# Patient Record
Sex: FEMALE | Race: WHITE | NOT HISPANIC OR LATINO | Employment: FULL TIME | URBAN - METROPOLITAN AREA
[De-identification: names, ages, dates, MRNs, and addresses within clinical notes are randomized per-mention and may not be internally consistent; named-entity substitution may affect disease eponyms.]

---

## 2017-01-23 RX ORDER — LISINOPRIL 20 MG/1
20 TABLET ORAL DAILY
Qty: 90 TABLET | Refills: 0 | Status: SHIPPED | OUTPATIENT
Start: 2017-01-23 | End: 2017-04-25 | Stop reason: SDUPTHER

## 2017-04-04 RX ORDER — ACYCLOVIR 400 MG/1
400 TABLET ORAL 2 TIMES DAILY
Qty: 60 TABLET | Refills: 11 | Status: SHIPPED | OUTPATIENT
Start: 2017-04-04 | End: 2018-05-14 | Stop reason: SDUPTHER

## 2017-04-25 RX ORDER — LISINOPRIL 20 MG/1
20 TABLET ORAL DAILY
Qty: 30 TABLET | Refills: 0 | Status: SHIPPED | OUTPATIENT
Start: 2017-04-25 | End: 2018-05-14

## 2017-06-20 ENCOUNTER — HOSPITAL ENCOUNTER (OUTPATIENT)
Dept: MAMMOGRAPHY | Facility: HOSPITAL | Age: 52
Discharge: HOME | End: 2017-06-20
Admitting: INTERNAL MEDICINE

## 2017-06-20 DIAGNOSIS — R92.8 ABNORMAL MAMMOGRAM: ICD-10-CM

## 2017-06-20 PROCEDURE — 77065 DX MAMMO INCL CAD UNI: CPT | Performed by: RADIOLOGY

## 2017-06-20 PROCEDURE — G0206 DX MAMMO INCL CAD UNI: HCPCS

## 2017-10-05 ENCOUNTER — TRANSCRIBE ORDERS (OUTPATIENT)
Dept: ADMINISTRATIVE | Facility: HOSPITAL | Age: 52
End: 2017-10-05

## 2017-10-05 DIAGNOSIS — R92.8 ABNORMAL MAMMOGRAM: Primary | ICD-10-CM

## 2017-10-19 ENCOUNTER — HOSPITAL ENCOUNTER (OUTPATIENT)
Dept: MAMMOGRAPHY | Facility: HOSPITAL | Age: 52
Discharge: HOME | End: 2017-10-19
Admitting: INTERNAL MEDICINE

## 2017-10-19 ENCOUNTER — TRANSCRIBE ORDERS (OUTPATIENT)
Dept: MAMMOGRAPHY | Facility: HOSPITAL | Age: 52
End: 2017-10-19

## 2017-10-19 DIAGNOSIS — R92.8 ABNORMAL MAMMOGRAM: ICD-10-CM

## 2017-10-19 DIAGNOSIS — R92.8 ABNORMAL MAMMOGRAM: Primary | ICD-10-CM

## 2017-10-19 PROCEDURE — G0279 TOMOSYNTHESIS, MAMMO: HCPCS

## 2017-10-19 PROCEDURE — 77062 BREAST TOMOSYNTHESIS BI: CPT | Performed by: RADIOLOGY

## 2017-10-19 PROCEDURE — G0204 DX MAMMO INCL CAD BI: HCPCS

## 2017-10-19 PROCEDURE — 77066 DX MAMMO INCL CAD BI: CPT | Performed by: RADIOLOGY

## 2017-11-06 ENCOUNTER — HOSPITAL ENCOUNTER (OUTPATIENT)
Dept: MAMMOGRAPHY | Facility: HOSPITAL | Age: 52
Discharge: HOME | End: 2017-11-06

## 2017-11-06 ENCOUNTER — HOSPITAL ENCOUNTER (OUTPATIENT)
Dept: MAMMOGRAPHY | Facility: HOSPITAL | Age: 52
Discharge: HOME | End: 2017-11-06
Attending: RADIOLOGY | Admitting: RADIOLOGY

## 2017-11-06 DIAGNOSIS — R92.8 ABNORMAL MAMMOGRAM: ICD-10-CM

## 2017-11-06 PROCEDURE — 88305 TISSUE EXAM BY PATHOLOGIST: CPT | Performed by: RADIOLOGY

## 2017-11-06 PROCEDURE — 76098 X-RAY EXAM SURGICAL SPECIMEN: CPT

## 2017-11-06 PROCEDURE — 19081 BX BREAST 1ST LESION STRTCTC: CPT | Performed by: RADIOLOGY

## 2017-11-06 PROCEDURE — A4648 IMPLANTABLE TISSUE MARKER: HCPCS

## 2017-11-06 RX ORDER — LIDOCAINE HYDROCHLORIDE AND EPINEPHRINE 10; 10 MG/ML; UG/ML
20 INJECTION, SOLUTION INFILTRATION; PERINEURAL ONCE
Status: COMPLETED | OUTPATIENT
Start: 2017-11-06 | End: 2017-11-06

## 2017-11-06 RX ORDER — LIDOCAINE HYDROCHLORIDE 10 MG/ML
5 INJECTION, SOLUTION INFILTRATION; PERINEURAL ONCE
Status: COMPLETED | OUTPATIENT
Start: 2017-11-06 | End: 2017-11-06

## 2017-11-06 RX ADMIN — LIDOCAINE HYDROCHLORIDE 4 ML: 10 INJECTION, SOLUTION INFILTRATION; PERINEURAL at 11:15

## 2017-11-06 RX ADMIN — LIDOCAINE HYDROCHLORIDE AND EPINEPHRINE 15 ML: 10; 10 INJECTION, SOLUTION INFILTRATION; PERINEURAL at 11:38

## 2017-11-07 LAB
CYTO UR: NORMAL
LAB AP CASE REPORT: NORMAL
LAB AP CLINICAL INFORMATION: NORMAL
LAB AP DIAGNOSIS COMMENT: NORMAL
Lab: NORMAL
PATH REPORT.FINAL DX SPEC: NORMAL
PATH REPORT.GROSS SPEC: NORMAL

## 2017-11-07 PROCEDURE — 77065 DX MAMMO INCL CAD UNI: CPT | Performed by: RADIOLOGY

## 2017-11-09 ENCOUNTER — TELEPHONE (OUTPATIENT)
Dept: MAMMOGRAPHY | Facility: HOSPITAL | Age: 52
End: 2017-11-09

## 2017-11-09 ENCOUNTER — TRANSCRIBE ORDERS (OUTPATIENT)
Dept: ADMINISTRATIVE | Facility: HOSPITAL | Age: 52
End: 2017-11-09

## 2017-11-09 DIAGNOSIS — R92.8 ABNORMAL MAMMOGRAM: Primary | ICD-10-CM

## 2017-11-09 NOTE — TELEPHONE ENCOUNTER
11.09.17 @ 1455: Pt notified of pathology results and recommendations. Verbalizes understanding. Denies discomfort. Denies any signs and symptoms of infection.

## 2018-05-10 ENCOUNTER — HOSPITAL ENCOUNTER (OUTPATIENT)
Dept: MAMMOGRAPHY | Facility: HOSPITAL | Age: 53
Discharge: HOME | End: 2018-05-10
Admitting: INTERNAL MEDICINE

## 2018-05-10 DIAGNOSIS — R92.8 ABNORMAL MAMMOGRAM: ICD-10-CM

## 2018-05-10 PROCEDURE — 77065 DX MAMMO INCL CAD UNI: CPT

## 2018-05-10 PROCEDURE — 77065 DX MAMMO INCL CAD UNI: CPT | Performed by: RADIOLOGY

## 2018-05-14 ENCOUNTER — OFFICE VISIT (OUTPATIENT)
Dept: INTERNAL MEDICINE | Facility: CLINIC | Age: 53
End: 2018-05-14

## 2018-05-14 VITALS
OXYGEN SATURATION: 97 % | TEMPERATURE: 98.8 F | HEIGHT: 60 IN | SYSTOLIC BLOOD PRESSURE: 108 MMHG | HEART RATE: 75 BPM | BODY MASS INDEX: 27.25 KG/M2 | WEIGHT: 138.8 LBS | DIASTOLIC BLOOD PRESSURE: 66 MMHG

## 2018-05-14 DIAGNOSIS — B00.9 HERPES SIMPLEX: Primary | ICD-10-CM

## 2018-05-14 PROCEDURE — 99213 OFFICE O/P EST LOW 20 MIN: CPT | Performed by: INTERNAL MEDICINE

## 2018-05-14 RX ORDER — ACYCLOVIR 400 MG/1
TABLET ORAL
Qty: 60 TABLET | Refills: 1 | OUTPATIENT
Start: 2018-05-14

## 2018-05-14 RX ORDER — ACYCLOVIR 400 MG/1
TABLET ORAL
Qty: 60 TABLET | Refills: 11 | Status: SHIPPED | OUTPATIENT
Start: 2018-05-14 | End: 2019-05-17 | Stop reason: SDUPTHER

## 2018-05-14 RX ORDER — TRAZODONE HYDROCHLORIDE 100 MG/1
100 TABLET ORAL NIGHTLY
COMMUNITY
End: 2020-10-12

## 2018-05-14 RX ORDER — ACYCLOVIR 400 MG/1
400 TABLET ORAL 3 TIMES DAILY
Qty: 15 TABLET | Refills: 5 | Status: SHIPPED | OUTPATIENT
Start: 2018-05-14 | End: 2018-05-19

## 2018-05-14 RX ORDER — HYDROXYZINE HYDROCHLORIDE 25 MG/1
25 TABLET, FILM COATED ORAL 3 TIMES DAILY PRN
COMMUNITY
End: 2020-10-12

## 2018-12-02 NOTE — PROGRESS NOTES
"Here for physical    Exercise: works part-time at [employer] and gets a lot of walking in then, but her main job is sedentary  Diet: has been eating more sweets recently; MVI daily    She had blood work done by Dr Ellsworth at [facility] on 10/19/18. CBC showed slightly elevated wbc at 10.5, Hct 36, MCV 75, CMP normal, D 44, TSH 5.25, normal FT4.  She has h/o alpha thalassemia trait and CBC is similar to what it always has been    In '13 we did thyroid ABs which were neg and a thyroid US which showed 2 small hypoechoic nodules felt to be colloid cysts on right. TSH had always been normal    Had L breast biopsy last year that showed fibrocystic changes    The following portions of the patient's history were reviewed and updated as appropriate: allergies, current medications, past family history, past medical history, past social history, past surgical history and problem list.    Review of Systems   Constitutional: + fatigue; neg fever. +wt gain  HENT: Negative for congestion and sore throat.    Eyes: Negative for visual disturbance.   Respiratory: Negative for cough and shortness of breath.    Cardiovascular: Negative for chest pain, palpitations and leg swelling.   Gastrointestinal: Negative for abdominal distention, abdominal pain, blood in stool, constipation, diarrhea and nausea.   Genitourinary: Negative for difficulty urinating, dysuria and frequency.   Musculoskeletal: Negative for arthralgias.   Skin: Negative for rash.   Allergic/Immunologic: Negative for immunocompromised state.   Neurological: Negative for dizziness and headaches.   Psychiatric/Behavioral: Negative for dysphoric mood and sleep disturbance.       Objective    Vitals:    12/03/18 1359   BP: 132/76   BP Location: Right arm   Pulse: 70   Temp: 98.7 °F (37.1 °C)   TempSrc: Temporal   SpO2: 99%   Weight: 68.2 kg (150 lb 6.4 oz)   Height: 149.9 cm (59\")     Physical Exam   Constitutional: She is oriented to person, place, and time. She appears " well-developed and well-nourished. No distress.   HENT:   Head: Normocephalic and atraumatic.   Right Ear: External ear normal.   Left Ear: External ear normal.   Mouth/Throat: Oropharynx is clear and moist. No oropharyngeal exudate.   Eyes: Conjunctivae and EOM are normal. Pupils are equal, round, and reactive to light.   Neck: Normal range of motion. Neck supple. No thyromegaly present.   Cardiovascular: Normal rate, regular rhythm, normal heart sounds and intact distal pulses.  Exam reveals no gallop and no friction rub.    No murmur heard.  Pulmonary/Chest: Effort normal and breath sounds normal. No respiratory distress. She has no wheezes. She has no rales.   Breast exam: no masses, no tenderness, no skin lesions B  Abdominal: Soft. Bowel sounds are normal. She exhibits no mass. There is no tenderness. There is no rebound and no guarding.   Musculoskeletal: Normal range of motion. She exhibits no edema or deformity.   Lymphadenopathy:     She has no cervical adenopathy.   Neurological: She is alert and oriented to person, place, and time. No cranial nerve deficit. She exhibits normal muscle tone. Coordination normal.   Skin: Skin is warm and dry. She is not diaphoretic.   Psychiatric: She has a normal mood and affect. Her behavior is normal. Judgment and thought content normal.       Assessment/Plan   Bhavani was seen today for annual exam.    Diagnoses and all orders for this visit:    Encounter for annual physical examRegular exercise/healthy diet. BSE q month. Sunscreen use encouraged. calcium intake reviewed. Check fasting labs  Mary Alice due now (6m diagnostic) - it is scheduled for 1/19  Colon due now  - we discussed risk of colon cancer but she still does not want to get. I did give her pamphlet on it  DT due 7/22  DEXA due now  Shingles - shingrix discussed -  can check at pharmacy since we do not have   Hep A - she had  Flu - she has had    -     POC Urinalysis Dipstick, Automated    Alpha thalassemia  (CMS/[facility])   -     CBC & Differential  -     CBC Auto Differential    Hypothyroidism (acquired)  Comments:  TSH slightly high in October. will rehcheck today  Orders:  -     TSH  -     T4, Free    Leukocytosis, unspecified type  -     CBC & Differential  -     CBC Auto Differential

## 2018-12-03 ENCOUNTER — OFFICE VISIT (OUTPATIENT)
Dept: INTERNAL MEDICINE | Facility: CLINIC | Age: 53
End: 2018-12-03

## 2018-12-03 VITALS
SYSTOLIC BLOOD PRESSURE: 132 MMHG | DIASTOLIC BLOOD PRESSURE: 76 MMHG | TEMPERATURE: 98.7 F | HEART RATE: 70 BPM | HEIGHT: 59 IN | BODY MASS INDEX: 30.32 KG/M2 | OXYGEN SATURATION: 99 % | WEIGHT: 150.4 LBS

## 2018-12-03 DIAGNOSIS — D72.829 LEUKOCYTOSIS, UNSPECIFIED TYPE: ICD-10-CM

## 2018-12-03 DIAGNOSIS — E03.9 HYPOTHYROIDISM (ACQUIRED): ICD-10-CM

## 2018-12-03 DIAGNOSIS — Z00.00 ENCOUNTER FOR ANNUAL PHYSICAL EXAM: Primary | ICD-10-CM

## 2018-12-03 DIAGNOSIS — D56.0 ALPHA THALASSEMIA (HCC): ICD-10-CM

## 2018-12-03 LAB
BASOPHILS # BLD AUTO: 0.03 10*3/MM3 (ref 0–0.2)
BASOPHILS NFR BLD AUTO: 0.3 % (ref 0–1)
BILIRUB BLD-MCNC: NEGATIVE MG/DL
CLARITY, POC: CLEAR
COLOR UR: YELLOW
DEPRECATED RDW RBC AUTO: 44.7 FL (ref 37–54)
EOSINOPHIL # BLD AUTO: 0.13 10*3/MM3 (ref 0–0.3)
EOSINOPHIL NFR BLD AUTO: 1.1 % (ref 0–3)
ERYTHROCYTE [DISTWIDTH] IN BLOOD BY AUTOMATED COUNT: 16.9 % (ref 11.3–14.5)
GLUCOSE UR STRIP-MCNC: NEGATIVE MG/DL
HCT VFR BLD AUTO: 34.7 % (ref 34.5–44)
HGB BLD-MCNC: 10.1 G/DL (ref 11.5–15.5)
IMM GRANULOCYTES # BLD: 0.1 10*3/MM3 (ref 0–0.03)
IMM GRANULOCYTES NFR BLD: 0.8 % (ref 0–0.6)
KETONES UR QL: NEGATIVE
LEUKOCYTE EST, POC: NEGATIVE
LYMPHOCYTES # BLD AUTO: 3.12 10*3/MM3 (ref 0.6–4.8)
LYMPHOCYTES NFR BLD AUTO: 26.2 % (ref 24–44)
MCH RBC QN AUTO: 20.9 PG (ref 27–31)
MCHC RBC AUTO-ENTMCNC: 29.1 G/DL (ref 32–36)
MCV RBC AUTO: 71.8 FL (ref 80–99)
MONOCYTES # BLD AUTO: 0.55 10*3/MM3 (ref 0–1)
MONOCYTES NFR BLD AUTO: 4.6 % (ref 0–12)
NEUTROPHILS # BLD AUTO: 7.99 10*3/MM3 (ref 1.5–8.3)
NEUTROPHILS NFR BLD AUTO: 67 % (ref 41–71)
NITRITE UR-MCNC: NEGATIVE MG/ML
PH UR: 6.5 [PH] (ref 5–8)
PLATELET # BLD AUTO: 364 10*3/MM3 (ref 150–450)
PMV BLD AUTO: 9 FL (ref 6–12)
PROT UR STRIP-MCNC: NEGATIVE MG/DL
RBC # BLD AUTO: 4.83 10*6/MM3 (ref 3.89–5.14)
RBC # UR STRIP: NEGATIVE /UL
SP GR UR: 1.01 (ref 1–1.03)
T4 FREE SERPL-MCNC: 0.87 NG/DL (ref 0.89–1.76)
TSH SERPL DL<=0.05 MIU/L-ACNC: 1.5 MIU/ML (ref 0.35–5.35)
UROBILINOGEN UR QL: NORMAL
WBC NRBC COR # BLD: 11.92 10*3/MM3 (ref 3.5–10.8)

## 2018-12-03 PROCEDURE — 81003 URINALYSIS AUTO W/O SCOPE: CPT | Performed by: INTERNAL MEDICINE

## 2018-12-03 PROCEDURE — 85025 COMPLETE CBC W/AUTO DIFF WBC: CPT | Performed by: INTERNAL MEDICINE

## 2018-12-03 PROCEDURE — 99396 PREV VISIT EST AGE 40-64: CPT | Performed by: INTERNAL MEDICINE

## 2018-12-03 PROCEDURE — 84443 ASSAY THYROID STIM HORMONE: CPT | Performed by: INTERNAL MEDICINE

## 2018-12-03 PROCEDURE — 84439 ASSAY OF FREE THYROXINE: CPT | Performed by: INTERNAL MEDICINE

## 2018-12-05 DIAGNOSIS — E03.9 HYPOTHYROIDISM (ACQUIRED): Primary | ICD-10-CM

## 2018-12-05 RX ORDER — LEVOTHYROXINE SODIUM 0.03 MG/1
25 TABLET ORAL DAILY
Qty: 30 TABLET | Refills: 2 | Status: SHIPPED | OUTPATIENT
Start: 2018-12-05 | End: 2019-02-28

## 2019-01-08 ENCOUNTER — TELEPHONE (OUTPATIENT)
Dept: INTERNAL MEDICINE | Facility: CLINIC | Age: 54
End: 2019-01-08

## 2019-01-08 NOTE — TELEPHONE ENCOUNTER
It looks like it has already been scheduled for 1/31. If she wants to change date she should be able to call breast center directly

## 2019-01-08 NOTE — TELEPHONE ENCOUNTER
MS. RAJPUT IS NEEDING A NEW MAMMO, ULTRASOUND OF HER LEFT BREAST, SHE IS HAVING PAIN WHERE HER CLIP IS IN HER LEFT BREAST. PLEASE CALL MS. RAJPUT WHEN THIS ORDER HAS BEEN ENTERED.

## 2019-01-10 NOTE — PROGRESS NOTES
"Subjective   Bhavani Coon is a 53 y.o. female.     History of Present Illness     L breast pain on the medial breast over the last 2 weeks, and some crust on the left nipple last few days, though no discharge seen.  Has not felt any definite nodules in the breast. She is status post benign stereotactic core biopsy of calcifications in the left breast done in 11/17. She is scheduled for a diagnostic hailee at the end of this month.  Caffeine intake - 2-3 diet cokes a day, 1 coffee    R Shoulder pain over the last 4-5 months - she had seen [facility](Dr Carrizales) in 8/16 for R shoulder pain and had an MRI then that did not show any rotator cuff tear, but did show some labral changes. She did PT at the time. She does not think she had an injection at the point. Pain did get better, but now has restarted. Does have to reach up a lot at work, so wondering if this is contributing. She did see [facility] again a few weeks ago and they  xrayed and wanted to get another MRI but her insurance won't cover so she would like to get a shot      The following portions of the patient's history were reviewed and updated as appropriate: allergies, current medications, past family history, past medical history, past social history, past surgical history and problem list.    Review of Systems   Constitutional: Negative for unexpected weight gain and unexpected weight loss.   Musculoskeletal: Positive for arthralgias.   Skin: Negative for color change and skin lesions.       Objective    Vitals:    01/11/19 1326   BP: 122/84   BP Location: Right arm   Temp: 98.8 °F (37.1 °C)   TempSrc: Temporal   Weight: 69 kg (152 lb 3.2 oz)   Height: 149.9 cm (59\")     Physical Exam   Constitutional: She is oriented to person, place, and time. She appears well-developed and well-nourished. No distress.   HENT:   Head: Normocephalic and atraumatic.   Nose: Nose normal.   Eyes: Conjunctivae and EOM are normal. Pupils are equal, round, and reactive to light. Right eye " exhibits no discharge. Left eye exhibits no discharge. No scleral icterus.   Pulmonary/Chest: Left breast exhibits tenderness. Left breast exhibits no inverted nipple, no mass, no nipple discharge and no skin change.       Neurological: She is alert and oriented to person, place, and time. No cranial nerve deficit.   Skin: Skin is warm and dry. No rash noted. She is not diaphoretic. No erythema.   Psychiatric: She has a normal mood and affect. Her behavior is normal. Judgment and thought content normal.   Nursing note and vitals reviewed.        Assessment/Plan   Bhavani was seen today for breast pain.    Diagnoses and all orders for this visit:    Breast pain, left  Comments:  reassurance - exam is normal today. she has diagnostic scheduled for the end of the month    Chronic right shoulder pain  Comments:  she will call [facility] to see if she can get injection

## 2019-01-11 ENCOUNTER — OFFICE VISIT (OUTPATIENT)
Dept: INTERNAL MEDICINE | Facility: CLINIC | Age: 54
End: 2019-01-11

## 2019-01-11 VITALS
WEIGHT: 152.2 LBS | DIASTOLIC BLOOD PRESSURE: 84 MMHG | TEMPERATURE: 98.8 F | HEIGHT: 59 IN | SYSTOLIC BLOOD PRESSURE: 122 MMHG | BODY MASS INDEX: 30.68 KG/M2

## 2019-01-11 DIAGNOSIS — M25.511 CHRONIC RIGHT SHOULDER PAIN: ICD-10-CM

## 2019-01-11 DIAGNOSIS — G89.29 CHRONIC RIGHT SHOULDER PAIN: ICD-10-CM

## 2019-01-11 DIAGNOSIS — N64.4 BREAST PAIN, LEFT: Primary | ICD-10-CM

## 2019-01-11 PROCEDURE — 99213 OFFICE O/P EST LOW 20 MIN: CPT | Performed by: INTERNAL MEDICINE

## 2019-01-31 ENCOUNTER — HOSPITAL ENCOUNTER (OUTPATIENT)
Dept: ULTRASOUND IMAGING | Facility: HOSPITAL | Age: 54
Discharge: HOME | End: 2019-01-31

## 2019-01-31 ENCOUNTER — HOSPITAL ENCOUNTER (OUTPATIENT)
Dept: MAMMOGRAPHY | Facility: HOSPITAL | Age: 54
Discharge: HOME | End: 2019-01-31
Admitting: RADIOLOGY

## 2019-01-31 ENCOUNTER — TRANSCRIBE ORDERS (OUTPATIENT)
Dept: MAMMOGRAPHY | Facility: HOSPITAL | Age: 54
End: 2019-01-31

## 2019-01-31 DIAGNOSIS — R92.8 ABNORMAL MAMMOGRAM: Primary | ICD-10-CM

## 2019-01-31 DIAGNOSIS — R92.8 ABNORMAL MAMMOGRAM: ICD-10-CM

## 2019-01-31 PROCEDURE — 77066 DX MAMMO INCL CAD BI: CPT

## 2019-01-31 PROCEDURE — 76642 ULTRASOUND BREAST LIMITED: CPT | Performed by: RADIOLOGY

## 2019-01-31 PROCEDURE — 77062 BREAST TOMOSYNTHESIS BI: CPT | Performed by: RADIOLOGY

## 2019-01-31 PROCEDURE — G0279 TOMOSYNTHESIS, MAMMO: HCPCS

## 2019-01-31 PROCEDURE — 77066 DX MAMMO INCL CAD BI: CPT | Performed by: RADIOLOGY

## 2019-01-31 PROCEDURE — 76642 ULTRASOUND BREAST LIMITED: CPT

## 2019-02-24 DIAGNOSIS — E03.9 HYPOTHYROIDISM (ACQUIRED): ICD-10-CM

## 2019-02-25 DIAGNOSIS — E03.9 HYPOTHYROIDISM (ACQUIRED): Primary | ICD-10-CM

## 2019-02-28 ENCOUNTER — LAB (OUTPATIENT)
Dept: INTERNAL MEDICINE | Facility: CLINIC | Age: 54
End: 2019-02-28

## 2019-02-28 DIAGNOSIS — E03.9 HYPOTHYROIDISM (ACQUIRED): ICD-10-CM

## 2019-02-28 LAB
T4 FREE SERPL-MCNC: 1.21 NG/DL (ref 0.89–1.76)
TSH SERPL DL<=0.05 MIU/L-ACNC: 3.42 MIU/ML (ref 0.35–5.35)

## 2019-02-28 PROCEDURE — 84439 ASSAY OF FREE THYROXINE: CPT | Performed by: INTERNAL MEDICINE

## 2019-02-28 PROCEDURE — 84443 ASSAY THYROID STIM HORMONE: CPT | Performed by: INTERNAL MEDICINE

## 2019-02-28 RX ORDER — LEVOTHYROXINE SODIUM 0.03 MG/1
TABLET ORAL
Qty: 30 TABLET | Refills: 2 | OUTPATIENT
Start: 2019-02-28

## 2019-02-28 RX ORDER — LEVOTHYROXINE SODIUM 0.05 MG/1
50 TABLET ORAL DAILY
Qty: 30 TABLET | Refills: 2 | Status: SHIPPED | OUTPATIENT
Start: 2019-02-28 | End: 2019-05-23 | Stop reason: SDUPTHER

## 2019-03-05 PROBLEM — E03.9 HYPOTHYROIDISM (ACQUIRED): Status: ACTIVE | Noted: 2019-03-05

## 2019-03-06 ENCOUNTER — OFFICE VISIT (OUTPATIENT)
Dept: INTERNAL MEDICINE | Facility: CLINIC | Age: 54
End: 2019-03-06

## 2019-03-06 ENCOUNTER — TELEPHONE (OUTPATIENT)
Dept: INTERNAL MEDICINE | Facility: CLINIC | Age: 54
End: 2019-03-06

## 2019-03-06 DIAGNOSIS — Z53.21 PATIENT LEFT WITHOUT BEING SEEN: Primary | ICD-10-CM

## 2019-03-06 NOTE — TELEPHONE ENCOUNTER
She has an appointment on March 12 with Dr. Barrientos for the hemorrhoids.  She wanted to let you know this.

## 2019-03-22 ENCOUNTER — OFFICE VISIT (OUTPATIENT)
Dept: INTERNAL MEDICINE | Facility: CLINIC | Age: 54
End: 2019-03-22

## 2019-03-22 VITALS
OXYGEN SATURATION: 98 % | SYSTOLIC BLOOD PRESSURE: 130 MMHG | TEMPERATURE: 99.4 F | BODY MASS INDEX: 31.45 KG/M2 | WEIGHT: 156 LBS | HEIGHT: 59 IN | DIASTOLIC BLOOD PRESSURE: 84 MMHG | HEART RATE: 75 BPM

## 2019-03-22 DIAGNOSIS — R63.1 POLYDIPSIA: ICD-10-CM

## 2019-03-22 DIAGNOSIS — R06.02 SOB (SHORTNESS OF BREATH): ICD-10-CM

## 2019-03-22 DIAGNOSIS — I10 BENIGN ESSENTIAL HYPERTENSION: ICD-10-CM

## 2019-03-22 DIAGNOSIS — E03.9 HYPOTHYROIDISM (ACQUIRED): ICD-10-CM

## 2019-03-22 DIAGNOSIS — R60.9 FLUID RETENTION: Primary | ICD-10-CM

## 2019-03-22 LAB
ALBUMIN SERPL-MCNC: 4.12 G/DL (ref 3.2–4.8)
ALBUMIN/GLOB SERPL: 2.1 G/DL (ref 1.5–2.5)
ALP SERPL-CCNC: 87 U/L (ref 25–100)
ALT SERPL W P-5'-P-CCNC: 36 U/L (ref 7–40)
ANION GAP SERPL CALCULATED.3IONS-SCNC: 8 MMOL/L (ref 3–11)
AST SERPL-CCNC: 26 U/L (ref 0–33)
BILIRUB BLD-MCNC: NEGATIVE MG/DL
BILIRUB SERPL-MCNC: 0.2 MG/DL (ref 0.3–1.2)
BUN BLD-MCNC: 5 MG/DL (ref 9–23)
BUN/CREAT SERPL: 7.8 (ref 7–25)
CALCIUM SPEC-SCNC: 9.1 MG/DL (ref 8.7–10.4)
CHLORIDE SERPL-SCNC: 105 MMOL/L (ref 99–109)
CLARITY, POC: CLEAR
CO2 SERPL-SCNC: 29 MMOL/L (ref 20–31)
COLOR UR: YELLOW
CREAT BLD-MCNC: 0.64 MG/DL (ref 0.6–1.3)
GFR SERPL CREATININE-BSD FRML MDRD: 97 ML/MIN/1.73
GLOBULIN UR ELPH-MCNC: 2 GM/DL
GLUCOSE BLD-MCNC: 89 MG/DL (ref 70–100)
GLUCOSE UR STRIP-MCNC: NEGATIVE MG/DL
KETONES UR QL: NEGATIVE
LEUKOCYTE EST, POC: NEGATIVE
NITRITE UR-MCNC: NEGATIVE MG/ML
PH UR: 7.5 [PH] (ref 5–8)
POTASSIUM BLD-SCNC: 3.9 MMOL/L (ref 3.5–5.5)
PROT SERPL-MCNC: 6.1 G/DL (ref 5.7–8.2)
PROT UR STRIP-MCNC: NEGATIVE MG/DL
RBC # UR STRIP: NEGATIVE /UL
SODIUM BLD-SCNC: 142 MMOL/L (ref 132–146)
SP GR UR: 1 (ref 1–1.03)
UROBILINOGEN UR QL: NORMAL

## 2019-03-22 PROCEDURE — 99214 OFFICE O/P EST MOD 30 MIN: CPT | Performed by: INTERNAL MEDICINE

## 2019-03-22 PROCEDURE — 81003 URINALYSIS AUTO W/O SCOPE: CPT | Performed by: INTERNAL MEDICINE

## 2019-03-22 PROCEDURE — 80053 COMPREHEN METABOLIC PANEL: CPT | Performed by: INTERNAL MEDICINE

## 2019-03-22 PROCEDURE — 83880 ASSAY OF NATRIURETIC PEPTIDE: CPT | Performed by: INTERNAL MEDICINE

## 2019-03-22 PROCEDURE — 36415 COLL VENOUS BLD VENIPUNCTURE: CPT | Performed by: INTERNAL MEDICINE

## 2019-03-22 NOTE — PROGRESS NOTES
"Subjective   Bhavani Coon is a 54 y.o. female.     History of Present Illness     Here for f/u on:  Fluid retention over the last week throughout body. Started in both ankles, then felt like it was throughout her body. She feels more winded. No orthopnea. No PND  She tries to watch her salt intake. Not a lot of fast food    She felt like she was getting a cold so started ibuprofen/sudafed [place] and is taking 1-2 daily, but no other OTCs.         The following portions of the patient's history were reviewed and updated as appropriate: allergies, current medications, past family history, past medical history, past social history, past surgical history and problem list.    Review of Systems   Constitutional: Positive for appetite change and unexpected weight gain. Negative for fever.   HENT: Positive for congestion.    Respiratory: Positive for shortness of breath.    Cardiovascular: Negative for chest pain.   Gastrointestinal: Negative for constipation and diarrhea.   Endocrine: Positive for polyuria.   Genitourinary: Positive for frequency.   Skin: Negative.    Allergic/Immunologic: Negative for immunocompromised state.   Neurological: Negative for seizures, memory problem and confusion.   Psychiatric/Behavioral: Negative for agitation.       Objective   /84 (BP Location: Right arm)   Pulse 75   Temp 99.4 °F (37.4 °C) (Temporal)   Ht 149.9 cm (59\")   Wt 70.8 kg (156 lb)   LMP  (LMP Unknown)   SpO2 98%   BMI 31.51 kg/m²   Physical Exam   Constitutional: She is oriented to person, place, and time. She appears well-developed and well-nourished. No distress.   HENT:   Head: Normocephalic and atraumatic.   Eyes: Conjunctivae and EOM are normal. Pupils are equal, round, and reactive to light. Right eye exhibits no discharge. Left eye exhibits no discharge.   Neck: Normal range of motion. Neck supple.   2cm posterior cervical lymph node   Cardiovascular: Normal rate and regular rhythm. "   Pulmonary/Chest: Effort normal and breath sounds normal. No stridor. No respiratory distress. She has no wheezes. She has no rales.   Abdominal: Soft. Bowel sounds are normal.   Musculoskeletal: She exhibits edema (1+ pittine edema B LEs).   Neurological: She is alert and oriented to person, place, and time. No cranial nerve deficit.   Skin: Skin is warm and dry. No rash noted. She is not diaphoretic.   Psychiatric: She has a normal mood and affect. Her behavior is normal. Judgment and thought content normal.   Nursing note and vitals reviewed.        Assessment/Plan   Bhavani was seen today for edema.    Diagnoses and all orders for this visit:    Fluid retention -we will have her DC the ibuprofen/Sudafed.  We will check UA and blood work.  Continue low-sodium diet and try to keep legs elevated.  We will consider further workup depending on lab results  -     Comprehensive Metabolic Panel  -     BNP  -     POC Urinalysis Dipstick, Automated    Hypothyroidism (acquired) -we raised dose last month and we will plan to recheck around May      SOB (shortness of breath) -pulse ox normal today.   -     Comprehensive Metabolic Panel  -     BNP    Polydipsia  -     POC Urinalysis Dipstick, Automated

## 2019-04-05 LAB — BNP SERPL-MCNC: 41 PG/ML (ref 0–100)

## 2019-05-17 RX ORDER — ACYCLOVIR 400 MG/1
TABLET ORAL
Qty: 60 TABLET | Refills: 11 | Status: SHIPPED | OUTPATIENT
Start: 2019-05-17 | End: 2019-11-11 | Stop reason: SDUPTHER

## 2019-05-23 DIAGNOSIS — E03.9 HYPOTHYROIDISM (ACQUIRED): Primary | ICD-10-CM

## 2019-05-23 NOTE — TELEPHONE ENCOUNTER
Patient will stop in tomorrow to have labs drawn. She is not out of medication yet. Can we make sure lab order is in . Thank you

## 2019-05-23 NOTE — TELEPHONE ENCOUNTER
She is due for recheck thyroid labs as we had raised her dose in Feb. I can put in order for labs, if she can stop by. Does she need refill or does she have enough until she can come in?

## 2019-05-24 ENCOUNTER — LAB (OUTPATIENT)
Dept: INTERNAL MEDICINE | Facility: CLINIC | Age: 54
End: 2019-05-24

## 2019-05-24 DIAGNOSIS — E03.9 HYPOTHYROIDISM (ACQUIRED): ICD-10-CM

## 2019-05-24 LAB
T4 FREE SERPL-MCNC: 1.38 NG/DL (ref 0.93–1.7)
TSH SERPL DL<=0.05 MIU/L-ACNC: 0.35 MIU/ML (ref 0.27–4.2)

## 2019-05-24 PROCEDURE — 84439 ASSAY OF FREE THYROXINE: CPT | Performed by: INTERNAL MEDICINE

## 2019-05-24 PROCEDURE — 84443 ASSAY THYROID STIM HORMONE: CPT | Performed by: INTERNAL MEDICINE

## 2019-05-25 RX ORDER — LEVOTHYROXINE SODIUM 0.05 MG/1
TABLET ORAL
Qty: 30 TABLET | Refills: 5 | Status: SHIPPED | OUTPATIENT
Start: 2019-05-25 | End: 2019-11-25 | Stop reason: SDUPTHER

## 2019-05-30 NOTE — PROGRESS NOTES
History of Present Illness   Here for f/u on:    Genital herpes - she had been on bid acyclovir as preventative,  but had been out for some time, and had a breakout of the genital herpes 3 days ago.  [facility] did give her a few for the weekend, but finished these and still w/ discomfort.  When she had been on the preventative dose, did not get outbreaks much at all    She is seeing [facility] and is on  hyrdoxyine and trazodone and doing ok on this combination. Dr Pugh, her GYN, had started lexapro as well for menopausal type symptoms    She had some labs done through [facility] recently through work, but she said there was some issue with the labs and maybe weren't actually done    H/o HTN - she had been on lisinopril but has been off for some time and BP has been OK    The following portions of the patient's history were reviewed and updated as appropriate: allergies, current medications, past family history, past medical history, past social history, past surgical history and problem list.    Review of Systems   Constitutional: Negative for fatigue and fever. +wt gain  Psych: anxiety - better w/ current med regimen   : Herpes outbreak. +hot flashes - on lexapro      Objective    Vitals:    05/14/18 1517   BP: 108/66   Pulse: 75   Temp: 98.8 °F (37.1 °C)   SpO2: 97%     Physical Exam   Constitutional: oriented to person, place, and time. well-developed and well-nourished. No distress.   HENT:   Head: Normocephalic and atraumatic.   Eyes: Conjunctivae and EOM are normal.   Cardiovascular: Normal rate, regular rhythm and normal heart sounds.  Exam reveals no gallop and no friction rub.    No murmur heard.  Pulmonary/Chest: Effort normal and breath sounds normal. No respiratory distress.  no wheezes.   Abd: soft, NTND  Neurological:  alert and oriented to person, place, and time.   Skin: Skin is warm and dry. not diaphoretic.   Psychiatric: normal mood and affect. Behavior and judgement normal    Assessment/Plan    Bhavani was seen today for med refill.    Diagnoses and all orders for this visit:    Herpes simplex - recent outbreak off the preventative. Will have her treat the outbreak w/ the tid dosing x 5 days, then restart the BID preventative dose  -     acyclovir (ZOVIRAX) 400 MG tablet; 1 bid. Start after finishing the 3x/day x 5 day dosing  -     acyclovir (ZOVIRAX) 400 MG tablet; Take 1 tablet by mouth 3 (Three) Times a Day for 5 days.        Preventative:  Will have her schedule a physical   [de-identified] : neck supple trach in place  [Normal] : mucosa is normal [Midline] : trachea located in midline position

## 2019-08-01 ENCOUNTER — HOSPITAL ENCOUNTER (OUTPATIENT)
Dept: MAMMOGRAPHY | Facility: HOSPITAL | Age: 54
Discharge: HOME | End: 2019-08-01
Attending: INTERNAL MEDICINE | Admitting: INTERNAL MEDICINE

## 2019-08-01 ENCOUNTER — TRANSCRIBE ORDERS (OUTPATIENT)
Dept: MAMMOGRAPHY | Facility: HOSPITAL | Age: 54
End: 2019-08-01

## 2019-08-01 DIAGNOSIS — R92.8 ABNORMAL MAMMOGRAM: ICD-10-CM

## 2019-08-01 DIAGNOSIS — R92.8 ABNORMAL MAMMOGRAM: Primary | ICD-10-CM

## 2019-08-01 PROCEDURE — 77065 DX MAMMO INCL CAD UNI: CPT

## 2019-08-01 PROCEDURE — 77065 DX MAMMO INCL CAD UNI: CPT | Performed by: RADIOLOGY

## 2019-08-01 PROCEDURE — G0279 TOMOSYNTHESIS, MAMMO: HCPCS

## 2019-09-23 ENCOUNTER — TELEPHONE (OUTPATIENT)
Dept: INTERNAL MEDICINE | Facility: CLINIC | Age: 54
End: 2019-09-23

## 2019-09-23 DIAGNOSIS — G89.29 CHRONIC RIGHT SHOULDER PAIN: Primary | ICD-10-CM

## 2019-09-23 DIAGNOSIS — M25.511 CHRONIC RIGHT SHOULDER PAIN: Primary | ICD-10-CM

## 2019-09-23 NOTE — TELEPHONE ENCOUNTER
Ok - I will put in referral. If she can get any imaging that [facility] did, and bring to appt w/ new ortho, that would be good

## 2019-09-23 NOTE — TELEPHONE ENCOUNTER
Patient states she would like to be referred to ortho.  She had discussed this at her physical appointment.

## 2019-09-23 NOTE — TELEPHONE ENCOUNTER
NIECY to  imaging at [facility] and take with her to her new appointment.

## 2019-09-23 NOTE — TELEPHONE ENCOUNTER
Pt called can barely move shoulder she states she talked in dr lawler and does not think she can do physical therapy at this time but really needs to get looked at

## 2019-10-10 ENCOUNTER — OFFICE VISIT (OUTPATIENT)
Dept: ORTHOPEDIC SURGERY | Facility: CLINIC | Age: 54
End: 2019-10-10

## 2019-10-10 VITALS — HEART RATE: 97 BPM | WEIGHT: 137 LBS | BODY MASS INDEX: 27.62 KG/M2 | OXYGEN SATURATION: 98 % | HEIGHT: 59 IN

## 2019-10-10 DIAGNOSIS — M25.512 ACUTE PAIN OF LEFT SHOULDER: Primary | ICD-10-CM

## 2019-10-10 DIAGNOSIS — S46.012A TRAUMATIC TEAR OF LEFT ROTATOR CUFF, UNSPECIFIED TEAR EXTENT, INITIAL ENCOUNTER: ICD-10-CM

## 2019-10-10 DIAGNOSIS — M75.02 ADHESIVE CAPSULITIS OF LEFT SHOULDER: ICD-10-CM

## 2019-10-10 PROCEDURE — 99203 OFFICE O/P NEW LOW 30 MIN: CPT | Performed by: PHYSICIAN ASSISTANT

## 2019-10-10 RX ORDER — BUSPIRONE HYDROCHLORIDE 10 MG/1
10 TABLET ORAL 3 TIMES DAILY
COMMUNITY
End: 2020-10-12

## 2019-10-10 NOTE — PROGRESS NOTES
[facility] Orthopaedic Surgery Clinic Note    Subjective     Chief Complaint   Patient presents with   • Left Shoulder - Pain        HPI  Bhavani Coon is a 54 y.o. female.  Left hand dominant.  Patient presents orthopedic clinic for left shoulder pain.  She states she has had intermittent pain to her left shoulder on and off for years however 2 months ago she got 2 new puppies and during a walk, they went one way and she the other.  Her arm was pulled/jerked.  She felt a pop in the shoulder area and had immediate pain.  Since then she has had continued increasing pain with inability to raise the arm.  Symptoms are worsening.  At this time she endorses a pain scale of 8/10.  Severity the pain moderate to severe.  Quality pain dull, aching, stabbing, shooting.  Associated symptoms grinding, stiffness and giving way.  The pain affects her sleep.  She has been taking Aleve without any benefit.  No reported numbness or tingling.  No prior history of injury or trauma to her shoulder.    No reported fever, chills, night sweats or other constitutional symptoms.    Past Medical History:   Diagnosis Date   • Abnormal EKG 2012    Dr Ceballos   • Alpha thalassemia (CMS/HCC)    • Anxiety    • Cyst of thyroid determined by ultrasound 10/2013    2 small colloid cysts, otherwise nml   • H/O mammogram 2017    [facility]   • Hemorrhoid    • Herpes simplex    • History of MRI of lumbar spine 01/2015    deg, post surgical changes   • Hx MRSA infection 06/2014    R leg   • Hx of mammogram 2014    dx'ic - probable benign calcifications, repeat in 6m   • Hypertension    • LBBB (left bundle branch block) 2014   • Microcytic anemia    • Pap smear for cervical cancer screening 2017    Dr. Pugh   • Plantar fasciitis of left foot    • Restless legs syndrome (RLS)    • Routine general medical examination at a health care facility    • Uncomplicated opioid dependence (CMS/HCC)       Past Surgical History:   Procedure Laterality Date   •  BREAST BIOPSY Left 2017    stereo bx left breast.    • CARDIAC CATHETERIZATION  2012    negative. Dr. Ceballos   • EYE MUSCLE SURGERY Left 01/2018   • KNEE SURGERY Right 2003    torn cartilage   • LAMINECTOMY  07/2009    L3 (Dr. Negro)   • LEEP  1995    cervical dysplasia   • LUMBAR DISCECTOMY  07/2009    Dr. Negro. L3-L4   • OOPHORECTOMY     • SUPRACERVICAL HYSTERECTOMY SALPINGO OOPHORECTOMY  2009    L SO      Family History   Problem Relation Age of Onset   • Hypertension Mother    • Anemia Mother    • Lymphoma Mother    • Hyperlipidemia Mother    • Liver cancer Father    • Diabetes Father    • Breast cancer Maternal Grandmother 57   • Stroke Maternal Grandmother    • Ovarian cancer Paternal Aunt 47     Social History     Socioeconomic History   • Marital status:      Spouse name: Not on file   • Number of children: Not on file   • Years of education: Not on file   • Highest education level: Not on file   Tobacco Use   • Smoking status: Never Smoker   Substance and Sexual Activity   • Alcohol use: Yes     Comment: 3-4/week   • Drug use: No   • Sexual activity: Not Currently      Current Outpatient Medications on File Prior to Visit   Medication Sig Dispense Refill   • acyclovir (ZOVIRAX) 400 MG tablet take 1 tablet by mouth twice a day AFTER FINISHING THE THREE TIMES A DAY FOR 5 DAYS DOSING 60 tablet 11   • busPIRone (BUSPAR) 10 MG tablet Take 10 mg by mouth 3 (Three) Times a Day.     • escitalopram (LEXAPRO) 10 MG tablet Take 1 tablet by mouth Daily. (Patient taking differently: Take 10 mg by mouth 3 (Three) Times a Day.) 30 tablet 5   • levothyroxine (SYNTHROID, LEVOTHROID) 50 MCG tablet take 1 tablet by mouth once daily 30 tablet 5   • hydrOXYzine (ATARAX) 25 MG tablet Take 25 mg by mouth 3 (Three) Times a Day As Needed for Itching.     • traZODone (DESYREL) 100 MG tablet Take 100 mg by mouth Every Night.       No current facility-administered medications on file prior to visit.       Allergies  "  Allergen Reactions   • Vitamin B12 Rash     Rash and nausea and vomiting        The following portions of the patient's history were reviewed and updated as appropriate: allergies, current medications, past family history, past medical history, past social history, past surgical history and problem list.    Review of Systems   Constitutional: Negative.    HENT: Negative.    Eyes: Negative.    Respiratory: Negative.    Cardiovascular: Negative.    Gastrointestinal: Negative.    Endocrine: Negative.    Genitourinary: Negative.    Musculoskeletal: Positive for arthralgias.   Skin: Negative.    Allergic/Immunologic: Negative.    Neurological: Negative.    Hematological: Negative.    Psychiatric/Behavioral: Negative.         Objective      Physical Exam  Pulse 97   Ht 149.9 cm (59.02\")   Wt 62.1 kg (137 lb)   LMP  (LMP Unknown)   SpO2 98%   BMI 27.66 kg/m²     Body mass index is 27.66 kg/m².    GENERAL APPEARANCE: awake, alert & oriented x 3, in no acute distress and well developed, well nourished  PSYCH: normal mood and affect  LUNGS:  breathing nonlabored, no wheezing  EYES: sclera anicteric, pupils equal  CARDIOVASCULAR: palpable pulses dorsalis pedis, palpable posterior tibial bilaterally. Capillary refill less than 2 seconds  INTEGUMENTARY: skin intact, no clubbing, cyanosis  NEUROLOGIC:  Normal Sensation         Ortho Exam  Musculoskeletal   Upper Extremity   Left Shoulder     Inspection and Palpation:     Tenderness -global tenderness throughout shoulder with increasing pain to the anterior and lateral aspect.    Crepitus - none    Sensation is normal    Examination reveals no ecchymosis.      Strength and Tone:    Supraspinatus - 3/5    External Rotators-3/5    Infraspinatus - 3/5    Subscapularis - 3/5    Deltoid  - fires     Range of Motion   Right shoulder:    Internal Rotation: ROM - T12    External Rotation: AROM - 80 degrees    Elevation through flexion: AROM - 180 degrees    Left " Shoulder:    Internal Rotation: ROM -side of her body    External Rotation: AROM - less than 25 degrees    Elevation through flexion: AROM - 80 degrees     Abduction: AROM - less than 45 degrees     Instability   Left shoulder    Sulcus sign negative    Apprehension test negative    Chan relocation test negative    Jerk test negative     Impingement   Left shoulder    Dudley-Karthikeyan impingement test unable to test because unable to get the arm into proper position but with any type of movement exacerbation of pain    Neer impingement test positive     Functional Testing   Left shoulder    AC crossover adduction test positive    Abdominal compression test positive    Lift-off sign unable to test because unable to get the arm into proper position but with any type of movement exacerbation of pain    Speed's test positive    Wood's test negative    Hornblower's sign negative       Imaging/Studies  Ordered left shoulder plain films.  Imaging reviewed by Dr. Drake.    Imaging Results (last 7 days)     Procedure Component Value Units Date/Time    XR Shoulder 2+ View Left [[id number]] Resulted:  10/10/19 1049     Updated:  10/10/19 1050    Narrative:       Indication: Left shoulder pain    Comparison: No prior xrays are available for comparison      Impression:            Left shoulder 3 views: Radiographs demonstrate no acute bony injury or   fracture.  Mild arthritic changes are seen about the glenohumeral joint   primarily inferiorly.  To moderate AC joint arthritis.  Glenohumeral joint   is concentrically reduced.          Assessment/Plan        ICD-10-CM ICD-9-CM   1. Acute pain of left shoulder M25.512 719.41   2. Traumatic tear of left rotator cuff, unspecified tear extent, initial encounter S46.012A 840.4   3. Adhesive capsulitis of left shoulder M75.02 726.0       Orders Placed This Encounter   Procedures   • XR Shoulder 2+ View Left   • MRI Shoulder Left Without Contrast        Acute left shoulder pain  secondary to rotator cuff tear and adhesive capsulitis.  Proceed with MRI for further evaluation as symptoms are worsening.  Recommend she continue with Aleve.  Gentle range of motion and stretching as much as possible to avoid further stiffness to the shoulder.  Follow-up after MRI completed to discuss results and further treatment options.  Questions and concerns answered.      Medical Decision Making  Management Options : over-the-counter medicine  Data/Risk: radiology tests       Jennifer Nieves PA-C  10/11/19  1:18 PM         EMR Dragon/Transcription disclaimer:  Much of this encounter note is an electronic transcription of spoken language to printed text. Electronic transcription of spoken language may permit erroneous, or at times, nonsensical words or phrases to be inadvertently transcribed. Although I have reviewed the note for such errors, some may still exist.

## 2019-10-22 ENCOUNTER — TELEPHONE (OUTPATIENT)
Dept: ORTHOPEDIC SURGERY | Facility: CLINIC | Age: 54
End: 2019-10-22

## 2019-10-22 RX ORDER — DIAZEPAM 5 MG/1
5 TABLET ORAL ONCE
Qty: 1 TABLET | Refills: 0 | Status: SHIPPED | OUTPATIENT
Start: 2019-10-22 | End: 2019-10-22

## 2019-10-22 NOTE — TELEPHONE ENCOUNTER
I called patient to let her know this and she will call with any further questions she may have.  Talisha

## 2019-10-22 NOTE — TELEPHONE ENCOUNTER
PATIENT WAS UNABLE TO TO DO HER MRI YESTERDAY DUE TO SEVERE ANXIETY.  PATIENT IS REQUESTING SOME SORT OF ANXIETY MEDICATION SO THAT SHE CAN DO THE MRI FOR HER SHOULDER.

## 2019-10-29 DIAGNOSIS — M25.512 ACUTE PAIN OF LEFT SHOULDER: ICD-10-CM

## 2019-10-29 DIAGNOSIS — S46.012A TRAUMATIC TEAR OF LEFT ROTATOR CUFF, UNSPECIFIED TEAR EXTENT, INITIAL ENCOUNTER: ICD-10-CM

## 2019-11-01 ENCOUNTER — OFFICE VISIT (OUTPATIENT)
Dept: ORTHOPEDIC SURGERY | Facility: CLINIC | Age: 54
End: 2019-11-01

## 2019-11-01 VITALS — BODY MASS INDEX: 27.6 KG/M2 | OXYGEN SATURATION: 99 % | HEART RATE: 93 BPM | WEIGHT: 136.91 LBS | HEIGHT: 59 IN

## 2019-11-01 DIAGNOSIS — M25.512 CHRONIC LEFT SHOULDER PAIN: Primary | ICD-10-CM

## 2019-11-01 DIAGNOSIS — M75.02 ADHESIVE CAPSULITIS OF LEFT SHOULDER: ICD-10-CM

## 2019-11-01 DIAGNOSIS — G89.29 CHRONIC LEFT SHOULDER PAIN: Primary | ICD-10-CM

## 2019-11-01 PROCEDURE — 99213 OFFICE O/P EST LOW 20 MIN: CPT | Performed by: PHYSICIAN ASSISTANT

## 2019-11-01 PROCEDURE — 20610 DRAIN/INJ JOINT/BURSA W/O US: CPT | Performed by: PHYSICIAN ASSISTANT

## 2019-11-01 RX ADMIN — LIDOCAINE HYDROCHLORIDE 4 ML: 10 INJECTION, SOLUTION EPIDURAL; INFILTRATION; INTRACAUDAL; PERINEURAL at 10:31

## 2019-11-01 RX ADMIN — TRIAMCINOLONE ACETONIDE 40 MG: 40 INJECTION, SUSPENSION INTRA-ARTICULAR; INTRAMUSCULAR at 10:31

## 2019-11-01 NOTE — PROGRESS NOTES
Procedure   Large Joint Arthrocentesis: L glenohumeral  Date/Time: 11/1/2019 10:31 AM  Consent given by: patient  Site marked: site marked  Timeout: Immediately prior to procedure a time out was called to verify the correct patient, procedure, equipment, support staff and site/side marked as required   Supporting Documentation  Indications: pain   Procedure Details  Location: shoulder - L glenohumeral  Preparation: Patient was prepped and draped in the usual sterile fashion  Needle size: 22 G  Approach: anterolateral  Medications administered: 40 mg triamcinolone acetonide 40 MG/ML; 4 mL lidocaine PF 1% 1 %  Patient tolerance: patient tolerated the procedure well with no immediate complications

## 2019-11-01 NOTE — PROGRESS NOTES
"    [facility] [place] [facility] Note    Subjective     CC: Follow-up (MRI Left shoulder)      HPI    Bhavani Coon is a 54 y.o. female.  Patient returns today for MRI follow-up.  She continues to have pain to the left shoulder with decreased range of motion.  She still endorsing a pain scale of 7/10.  Severity the pain moderate to severe.  Quality pain dull, aching, burning, throbbing, stabbing, shooting.  Associated symptoms popping, grinding, stiffness and sensation and giving away.  She is taking Aleve as needed.  Once again no numbness or tingling into the extremity.      ROS:    Constiutional:Pt denies fever, chills, nausea, or vomiting.  MSK:as above    Objective      Past Medical History  Past Medical History:   Diagnosis Date   • Abnormal EKG 2012    Dr Ceballos   • Alpha thalassemia (CMS/HCC)    • Anxiety    • Cyst of thyroid determined by ultrasound 10/2013    2 small colloid cysts, otherwise nml   • H/O mammogram 2017    Yazidism   • Hemorrhoid    • Herpes simplex    • History of MRI of lumbar spine 01/2015    deg, post surgical changes   • Hx MRSA infection 06/2014    R leg   • Hx of mammogram 2014    dx'ic - probable benign calcifications, repeat in 6m   • Hypertension    • LBBB (left bundle branch block) 2014   • Microcytic anemia    • Pap smear for cervical cancer screening 2017    Dr. Pugh   • Plantar fasciitis of left foot    • Restless legs syndrome (RLS)    • Routine general medical examination at a health care facility    • Uncomplicated opioid dependence (CMS/HCC)          Physical Exam  Pulse 93   Ht 149.9 cm (59.02\")   Wt 62.1 kg (136 lb 14.5 oz)   LMP  (LMP Unknown)   SpO2 99%   BMI 27.64 kg/m²     Body mass index is 27.64 kg/m².    Patient is well nourished and well developed.        Ortho Exam  Peripheral Vascular   Left Upper Extremity    No cyanotic nail beds    Pink nail beds and rapid capillary refill   Palpation    Radial Pulse - Bilaterally " normal    Musculoskeletal   Upper Extremity   Left Shoulder     Inspection and Palpation:    Sensation is normal     Strength and Tone:    Supraspinatus - 3/5    Infraspinatus - 3/5    Biceps - 5/5    Subscapularis-3/5      Tenderness: 10 use with global tenderness throughout shoulder especially to the anterior and lateral aspect.      Range of Motion   Left Shoulder:    Internal Rotation: ROM -side of her body.    External Rotation: AROM -15 degrees    Elevation through flexion: AROM - 80 degrees                           Abduction: AROM - less than 45 degrees                Impingement              Left shoulder                          Dudley-Karthikeyan impingement test unable to test because unable to get the arm into proper position but with any type of movement exacerbation of pain                          Neer impingement test positive                 Functional Testing              Left shoulder                          AC crossover adduction test positive                          Abdominal compression test positive                          Lift-off sign unable to test because unable to get the arm into proper position but with any type of movement exacerbation of pain                          Speed's test positive                          Wood's test negative                          Hornblower's sign negative      Imaging/Labs/EMG Reviewed:  Dr. Hudson and I reviewed noncontrast MRI of her left shoulder performed on 10/28/2019 at [facility].    Interpretation per Dr. Donell Rodarte: Capsulitis present with significant glenohumeral joint effusion.  Posterior inferior labrum appears small.  Degenerative spurring noted medial humeral head and neck.  Biceps tendon small without evidence of tear.    Images will be downloaded into our system.    Assessment:  1. Chronic left shoulder pain    2. Adhesive capsulitis of left shoulder        Plan:  1. Ongoing left shoulder pain due to adhesive capsulitis.     2. Patient offered and accepted a glenohumeral corticosteroid injection.  Injection was given today.  3. Patient was referred to formal physical therapy working on range of motion and stretching.  4. Recommend continue with over-the-counter pain medications as needed.  Patient is currently taking Aleve which I recommend she continue taking.  5. Follow-up in 6 weeks for repeat evaluation, sooner if issues arise or symptoms worsen/change.    After discussing the risks, benefits, indications of injection, the patient gave consent to proceed.  Left shoulder was confirmed as the correct joint to be injected with a timeout.  It was then prepped using Hibiclens and injected with a mixture of 4 cc of 1% plain lidocaine and 1 cc of Kenalog (40 mg per mL), without any resistance through the posterior approach, patient in seated position.  Area was cleaned, hemostasis was achieved and a Band-Aid was applied over the injection site.  The patient tolerated procedure well.  I instructed the patient on signs and symptoms of infection.  They should report to the emergency department or return to clinic if any of these develop, for further evaluation and treatment.  Recommended modifying activity for the next 48 hours to include rest, ice, elevation and oral pain medication as needed.        Jennifer Nieves PA-C  11/04/19  10:15 AM

## 2019-11-04 RX ORDER — TRIAMCINOLONE ACETONIDE 40 MG/ML
40 INJECTION, SUSPENSION INTRA-ARTICULAR; INTRAMUSCULAR
Status: COMPLETED | OUTPATIENT
Start: 2019-11-01 | End: 2019-11-01

## 2019-11-04 RX ORDER — LIDOCAINE HYDROCHLORIDE 10 MG/ML
4 INJECTION, SOLUTION EPIDURAL; INFILTRATION; INTRACAUDAL; PERINEURAL
Status: COMPLETED | OUTPATIENT
Start: 2019-11-01 | End: 2019-11-01

## 2019-11-05 ENCOUNTER — TELEPHONE (OUTPATIENT)
Dept: INTERNAL MEDICINE | Facility: CLINIC | Age: 54
End: 2019-11-05

## 2019-11-05 NOTE — TELEPHONE ENCOUNTER
Patient is taking the acyclovir twice a day on a regular basis.  Has a break out and has increase a couple of days to three a day and has not helped.

## 2019-11-05 NOTE — TELEPHONE ENCOUNTER
If it is not responding to the 3x/day dosing of acylovir it may be something else - if she can come in for appt so we can look at it

## 2019-11-06 ENCOUNTER — TREATMENT (OUTPATIENT)
Dept: PHYSICAL THERAPY | Facility: CLINIC | Age: 54
End: 2019-11-06

## 2019-11-06 DIAGNOSIS — G89.29 CHRONIC LEFT SHOULDER PAIN: Primary | ICD-10-CM

## 2019-11-06 DIAGNOSIS — M25.512 CHRONIC LEFT SHOULDER PAIN: Primary | ICD-10-CM

## 2019-11-06 PROCEDURE — 97140 MANUAL THERAPY 1/> REGIONS: CPT | Performed by: PHYSICAL THERAPIST

## 2019-11-06 PROCEDURE — 97110 THERAPEUTIC EXERCISES: CPT | Performed by: PHYSICAL THERAPIST

## 2019-11-06 PROCEDURE — 97161 PT EVAL LOW COMPLEX 20 MIN: CPT | Performed by: PHYSICAL THERAPIST

## 2019-11-06 NOTE — TELEPHONE ENCOUNTER
PN and she will try the three times a day dosing.  If it does not get any better she will make an appointment.

## 2019-11-06 NOTE — PROGRESS NOTES
Physical Therapy Initial Evaluation and Plan of Care    Patient: Bhavani Coon   : 1965  Diagnosis/ICD-10 Code:  Chronic left shoulder pain [M25.512, G89.29]  Referring practitioner: Jennifer Nieves, *  Date of Initial Visit: 2019  Today's Date: 2019  Patient seen for 1 sessions             Subjective Evaluation    History of Present Illness  Mechanism of injury: Pt reports that her shoulder has been hurting her and she has had some increased pain with trying to hold her two puppies. She started to have very bad pain in the shoulder and eventually went to the MD and had an injection in the left shoulder, and it seems to have decreased her pain slightly. The shoulder has been hurting for a couple of months now.       Patient Occupation: Pt does mostly desk work.  Quality of life: good    Pain  Current pain ratin  At best pain ratin  At worst pain ratin  Location: Left shoulder - specific area depends on position and movement  Quality: dull ache and sharp  Relieving factors: rest  Aggravating factors: lifting, movement, outstretched reach and overhead activity  Progression: no change    Hand dominance: left    Diagnostic Tests  MRI studies: abnormal (see imaging section in epic for detailed MRI report)    Treatments  Previous treatment: injection treatment  Patient Goals  Patient goals for therapy: decreased pain, increased motion, increased strength and independence with ADLs/IADLs             Objective       Palpation     Additional Palpation Details  TTP noted at the left anteroor GH joint line and left deltoid.     Active Range of Motion   Left Shoulder   Flexion: 121 degrees   Extension: 60 degrees   Abduction: 75 degrees   External rotation 0°: 31 degrees   Internal rotation BTB: T10     Right Shoulder   Flexion: 142 degrees   Extension: 70 degrees   Abduction: 125 degrees   External rotation 0°: 40 degrees   Internal rotation BTB: T8     Strength/Myotome Testing      Left Shoulder     Planes of Motion   Flexion: 4+   Abduction: 4   External rotation at 0°: 5   Internal rotation at 0°: 5     Tests     Left Shoulder   Positive Hawkin's.   Negative active compression (Wells), drop arm, empty can and full can.          Assessment & Plan     Assessment  Impairments: abnormal muscle tone, abnormal or restricted ROM, activity intolerance, impaired physical strength, lacks appropriate home exercise program and pain with function  Assessment details: Patient is a 54 year old female who comes to physical therapy with c.o pain in the left shoulder. Signs and symptoms are consistent with left shoulder impingement with possible RTC involvement resulting in pain, decreased ROM, decreased strength, and inability to perform all essential functional activities. Pt will benefit from skilled PT services to address the above issues.     Prognosis details: SHORT TERM GOALS:     2 weeks  1. Pt I w/ HEP  2. Pt to demonstrate PROM of the left shoulder to WFL to improve ability to perform ADL's  3. Pt to demonstrate ability to perform 30 minutes continuous activity in the clinic without increase in pain     LONG TERM GOALS:   6 weeks  1. Pt to demonstrate AROM of the left shoulder to WNL to allow ability to perform all necessary functional activities  2. Pt to demonstrate ability to lift 5# OH with the left arm without increase in pain  3. Pt to report being able to work full duty without increase in pain in the shoulder  4. Pt to tolerate 60 minutes continuous activity in the clinic without increase in pain     Functional Limitations: carrying objects, lifting, pulling, pushing, uncomfortable because of pain, reaching behind back, reaching overhead and unable to perform repetitive tasks  Plan  Therapy options: will be seen for skilled physical therapy services  Planned modality interventions: cryotherapy, electrical stimulation/Russian stimulation, high voltage pulsed current (pain management),  iontophoresis, microcurrent electrical stimulation, TENS, thermotherapy (hydrocollator packs) and ultrasound  Planned therapy interventions: abdominal trunk stabilization, ADL retraining, body mechanics training, flexibility, functional ROM exercises, home exercise program, IADL retraining, joint mobilization, manual therapy, neuromuscular re-education, postural training, soft tissue mobilization, spinal/joint mobilization, strengthening, stretching and therapeutic activities  Frequency: 2x week  Duration in weeks: 6  Treatment plan discussed with: patient        Manual Therapy:    14     mins  34783;  Therapeutic Exercise:    16     mins  12210;     Neuromuscular Maria Alejandra:        mins  25434;    Therapeutic Activity:          mins  72549;     Gait Training:           mins  85302;     Ultrasound:          mins  14598;    Electrical Stimulation:         mins  60618 ( );  Iontophoresis          mins 37414   Traction          mins  43749  Fluidotherapy          mins  29035  Dry Needling          mins self-pay  Paraffin          mins  80185    Timed Treatment:   30   mins   Total Treatment:     60   mins    PT SIGNATURE: Sarbjit Nur, PT, DPT, OCS, Cert. DN   DATE TREATMENT INITIATED: 11/6/2019    Initial Certification  Certification Period: 2/4/2020  I certify that the therapy services are furnished while this patient is under my care.  The services outlined above are required by this patient, and will be reviewed every 90 days.     PHYSICIAN: Jennifer Nieves PA-C      DATE:     Please sign and return via fax to [phone].. Thank you, [facility].

## 2019-11-11 RX ORDER — ACYCLOVIR 400 MG/1
TABLET ORAL
Qty: 90 TABLET | Refills: 11 | Status: SHIPPED | OUTPATIENT
Start: 2019-11-11 | End: 2020-12-14

## 2019-11-11 NOTE — TELEPHONE ENCOUNTER
Can you sent this in a little different so it will cover when she has a breakout and she can get filled early.

## 2019-11-25 ENCOUNTER — TELEPHONE (OUTPATIENT)
Dept: INTERNAL MEDICINE | Facility: CLINIC | Age: 54
End: 2019-11-25

## 2019-11-25 RX ORDER — LEVOTHYROXINE SODIUM 0.05 MG/1
50 TABLET ORAL DAILY
Qty: 30 TABLET | Refills: 0 | Status: SHIPPED | OUTPATIENT
Start: 2019-11-25 | End: 2019-12-12 | Stop reason: SDUPTHER

## 2019-11-25 NOTE — TELEPHONE ENCOUNTER
If she could schedule a f/u to see how she is feeling on the thyroid and we will check the labs the day of appt

## 2019-11-25 NOTE — TELEPHONE ENCOUNTER
FARHAD and she does have any thyroid med at this time.  Can you send in 30 days to [facility] and she will call back and make an appointment.

## 2019-12-11 ENCOUNTER — OFFICE VISIT (OUTPATIENT)
Dept: INTERNAL MEDICINE | Facility: CLINIC | Age: 54
End: 2019-12-11

## 2019-12-11 VITALS
DIASTOLIC BLOOD PRESSURE: 72 MMHG | WEIGHT: 144.4 LBS | HEART RATE: 76 BPM | SYSTOLIC BLOOD PRESSURE: 122 MMHG | BODY MASS INDEX: 29.11 KG/M2 | HEIGHT: 59 IN | TEMPERATURE: 98.1 F | OXYGEN SATURATION: 98 %

## 2019-12-11 DIAGNOSIS — I10 BENIGN ESSENTIAL HYPERTENSION: ICD-10-CM

## 2019-12-11 DIAGNOSIS — E03.9 HYPOTHYROIDISM (ACQUIRED): Primary | ICD-10-CM

## 2019-12-11 LAB
ALBUMIN SERPL-MCNC: 4.2 G/DL (ref 3.5–5.2)
ALBUMIN/GLOB SERPL: 1.3 G/DL
ALP SERPL-CCNC: 99 U/L (ref 39–117)
ALT SERPL W P-5'-P-CCNC: 10 U/L (ref 1–33)
ANION GAP SERPL CALCULATED.3IONS-SCNC: 10.5 MMOL/L (ref 5–15)
AST SERPL-CCNC: 13 U/L (ref 1–32)
BILIRUB SERPL-MCNC: <0.2 MG/DL (ref 0.2–1.2)
BUN BLD-MCNC: 8 MG/DL (ref 6–20)
BUN/CREAT SERPL: 11.8 (ref 7–25)
CALCIUM SPEC-SCNC: 9.1 MG/DL (ref 8.6–10.5)
CHLORIDE SERPL-SCNC: 100 MMOL/L (ref 98–107)
CO2 SERPL-SCNC: 29.5 MMOL/L (ref 22–29)
CREAT BLD-MCNC: 0.68 MG/DL (ref 0.57–1)
DEPRECATED RDW RBC AUTO: 38.8 FL (ref 37–54)
ERYTHROCYTE [DISTWIDTH] IN BLOOD BY AUTOMATED COUNT: 16.4 % (ref 12.3–15.4)
GFR SERPL CREATININE-BSD FRML MDRD: 91 ML/MIN/1.73
GLOBULIN UR ELPH-MCNC: 3.2 GM/DL
GLUCOSE BLD-MCNC: 91 MG/DL (ref 65–99)
HCT VFR BLD AUTO: 33.1 % (ref 34–46.6)
HGB BLD-MCNC: 9.9 G/DL (ref 12–15.9)
MCH RBC QN AUTO: 20.5 PG (ref 26.6–33)
MCHC RBC AUTO-ENTMCNC: 29.9 G/DL (ref 31.5–35.7)
MCV RBC AUTO: 68.4 FL (ref 79–97)
PLATELET # BLD AUTO: 390 10*3/MM3 (ref 140–450)
PMV BLD AUTO: 9.2 FL (ref 6–12)
POTASSIUM BLD-SCNC: 4.2 MMOL/L (ref 3.5–5.2)
PROT SERPL-MCNC: 7.4 G/DL (ref 6–8.5)
RBC # BLD AUTO: 4.84 10*6/MM3 (ref 3.77–5.28)
SODIUM BLD-SCNC: 140 MMOL/L (ref 136–145)
T4 FREE SERPL-MCNC: 1.42 NG/DL (ref 0.93–1.7)
TSH SERPL DL<=0.05 MIU/L-ACNC: 0.65 UIU/ML (ref 0.27–4.2)
WBC NRBC COR # BLD: 10.66 10*3/MM3 (ref 3.4–10.8)

## 2019-12-11 PROCEDURE — 84443 ASSAY THYROID STIM HORMONE: CPT | Performed by: INTERNAL MEDICINE

## 2019-12-11 PROCEDURE — 84439 ASSAY OF FREE THYROXINE: CPT | Performed by: INTERNAL MEDICINE

## 2019-12-11 PROCEDURE — 80053 COMPREHEN METABOLIC PANEL: CPT | Performed by: INTERNAL MEDICINE

## 2019-12-11 PROCEDURE — 99213 OFFICE O/P EST LOW 20 MIN: CPT | Performed by: INTERNAL MEDICINE

## 2019-12-11 PROCEDURE — 85027 COMPLETE CBC AUTOMATED: CPT | Performed by: INTERNAL MEDICINE

## 2019-12-11 NOTE — PROGRESS NOTES
"Subjective   Bhavani Coon is a 54 y.o. female.     History of Present Illness     Here for f/u on:    Hypothyroid - feels ok -energy ok, and weight is down from 3/19. Does have 2 puppies so staying pretty active.     She did get remarried last month to her ex-      Current Outpatient Medications on File Prior to Visit   Medication Sig Dispense Refill   • acyclovir (ZOVIRAX) 400 MG tablet 1 tid x 5 days prn breakout; 1 bid between breakouts 90 tablet 11   • busPIRone (BUSPAR) 10 MG tablet Take 10 mg by mouth 3 (Three) Times a Day.     • escitalopram (LEXAPRO) 10 MG tablet Take 1 tablet by mouth Daily. (Patient taking differently: Take 10 mg by mouth 3 (Three) Times a Day.) 30 tablet 5   • hydrOXYzine (ATARAX) 25 MG tablet Take 25 mg by mouth 3 (Three) Times a Day As Needed for Itching.     • levothyroxine (SYNTHROID, LEVOTHROID) 50 MCG tablet Take 1 tablet by mouth Daily. 30 tablet 0   • traZODone (DESYREL) 100 MG tablet Take 100 mg by mouth Every Night.       No current facility-administered medications on file prior to visit.        The following portions of the patient's history were reviewed and updated as appropriate: allergies, current medications, past family history, past medical history, past social history, past surgical history and problem list.    Review of Systems   Constitutional: Positive for activity change and unexpected weight loss (compared w/ March). Negative for appetite change, fatigue and unexpected weight gain.   Cardiovascular: Negative for chest pain and leg swelling.   Allergic/Immunologic: Negative for immunocompromised state.       Objective   /72 (BP Location: Right arm, Patient Position: Sitting)   Pulse 76   Temp 98.1 °F (36.7 °C) (Temporal)   Ht 149.9 cm (59\")   Wt 65.5 kg (144 lb 6.4 oz)   LMP  (LMP Unknown)   SpO2 98%   BMI 29.17 kg/m²   Physical Exam   Constitutional: She is oriented to person, place, and time. She appears well-developed and " well-nourished. No distress.   HENT:   Head: Normocephalic and atraumatic.   Eyes: Pupils are equal, round, and reactive to light. Conjunctivae and EOM are normal. Right eye exhibits no discharge. Left eye exhibits no discharge.   Neck: Normal range of motion. Neck supple.   Posterior cervical LN on left ~2cm, nontender   Cardiovascular: Normal rate and regular rhythm.   Pulmonary/Chest: Effort normal and breath sounds normal.   Musculoskeletal: She exhibits no edema.   Neurological: She is alert and oriented to person, place, and time. No cranial nerve deficit.   Skin: Skin is warm and dry. No rash noted. She is not diaphoretic.   Psychiatric: She has a normal mood and affect. Her behavior is normal. Judgment and thought content normal.   Nursing note and vitals reviewed.        Assessment/Plan   Bhavani was seen today for hypothyroidism and med refill.    Diagnoses and all orders for this visit:    Hypothyroidism (acquired) -we will recheck levels today  -     T4, Free  -     TSH    Benign essential hypertension -patient has been off antihypertensives for several years and blood pressure has stayed stable  -     CBC (No Diff)  -     Comprehensive Metabolic Panel        Prev - she had flu vaccine  The lymph node on the left has stayed about the same size.  We will continue to monitor.

## 2019-12-12 RX ORDER — LEVOTHYROXINE SODIUM 0.05 MG/1
50 TABLET ORAL DAILY
Qty: 30 TABLET | Refills: 11 | Status: SHIPPED | OUTPATIENT
Start: 2019-12-12 | End: 2021-01-14

## 2019-12-13 ENCOUNTER — APPOINTMENT (OUTPATIENT)
Dept: GENERAL RADIOLOGY | Facility: HOSPITAL | Age: 54
End: 2019-12-13

## 2019-12-13 ENCOUNTER — OFFICE VISIT (OUTPATIENT)
Dept: ORTHOPEDIC SURGERY | Facility: CLINIC | Age: 54
End: 2019-12-13

## 2019-12-13 VITALS — HEART RATE: 95 BPM | HEIGHT: 59 IN | BODY MASS INDEX: 29.11 KG/M2 | OXYGEN SATURATION: 99 % | WEIGHT: 144.4 LBS

## 2019-12-13 DIAGNOSIS — M25.511 CHRONIC RIGHT SHOULDER PAIN: Primary | ICD-10-CM

## 2019-12-13 DIAGNOSIS — M75.02 ADHESIVE CAPSULITIS OF LEFT SHOULDER: ICD-10-CM

## 2019-12-13 DIAGNOSIS — G89.29 CHRONIC RIGHT SHOULDER PAIN: Primary | ICD-10-CM

## 2019-12-13 DIAGNOSIS — M19.011 OSTEOARTHRITIS OF GLENOHUMERAL JOINT, RIGHT: ICD-10-CM

## 2019-12-13 DIAGNOSIS — M19.011 OSTEOARTHRITIS OF RIGHT ACROMIOCLAVICULAR JOINT: ICD-10-CM

## 2019-12-13 PROCEDURE — 20610 DRAIN/INJ JOINT/BURSA W/O US: CPT | Performed by: PHYSICIAN ASSISTANT

## 2019-12-13 PROCEDURE — 99213 OFFICE O/P EST LOW 20 MIN: CPT | Performed by: PHYSICIAN ASSISTANT

## 2019-12-13 RX ADMIN — TRIAMCINOLONE ACETONIDE 40 MG: 40 INJECTION, SUSPENSION INTRA-ARTICULAR; INTRAMUSCULAR at 11:06

## 2019-12-13 RX ADMIN — LIDOCAINE HYDROCHLORIDE 4 ML: 10 INJECTION, SOLUTION EPIDURAL; INFILTRATION; INTRACAUDAL; PERINEURAL at 11:06

## 2019-12-13 NOTE — PROGRESS NOTES
"    [facility] [place] [facility] Note    Subjective     CC: Pain of the Right Shoulder and Follow-up (6 week f/u; Chronic left shoulder pain (glenohumeral corticosteroid injection 11/4/19))      HPI    Bhavani Coon is a 54 y.o. female.  She returns for follow-up evaluation of her left shoulder.  She has been followed for left shoulder pain with adhesive capsulitis.  She is been undergoing formal PT and noticed significant improvement with regards to pain as well as range of motion.  At this time she is now complaining of right shoulder pain with limited range of motion.  She currently endorses a pain scale of 3-5/10.  Severity the pain moderate.  Quality of pain dull, aching, throbbing, shooting.  Associated symptoms stiffness, grinding, popping.  No reported numbness or tingling into the right or left upper extremities.    ROS:    Constiutional:Pt denies fever, chills, nausea, or vomiting.  MSK:as above    Objective      Past Medical History  Past Medical History:   Diagnosis Date   • Abnormal EKG 2012    Dr Ceballos   • Alpha thalassemia (CMS/HCC)    • Anxiety    • Cyst of thyroid determined by ultrasound 10/2013    2 small colloid cysts, otherwise nml   • H/O mammogram 2017    Yazidism   • Hemorrhoid    • Herpes simplex    • History of MRI of lumbar spine 01/2015    deg, post surgical changes   • Hx MRSA infection 06/2014    R leg   • Hx of mammogram 2014    dx'ic - probable benign calcifications, repeat in 6m   • Hypertension    • LBBB (left bundle branch block) 2014   • Microcytic anemia    • Pap smear for cervical cancer screening 2017    Dr. Pugh   • Plantar fasciitis of left foot    • Restless legs syndrome (RLS)    • Routine general medical examination at a health care facility    • Uncomplicated opioid dependence (CMS/HCC)          Physical Exam  Pulse 95   Ht 149.9 cm (59.02\")   Wt 65.5 kg (144 lb 6.4 oz)   LMP  (LMP Unknown)   SpO2 99%   BMI 29.15 kg/m²     Body mass index is 29.15 " kg/m².    Patient is well nourished and well developed.        Ortho Exam  Left shoulder  Forward flexion 160, abduction 120, external rotation 65, internal rotation L3    Right shoulder  Skin: Intact without any erythema, warmth or swelling.    Tenderness: Positive anterior to posterior shoulder as well as deep inside shoulder.  Also with tenderness over ACJ.  Motion: Active FF 90, Abd <90, ER (elbows at side) 45, IR L5  Impingement: Neer positive.  Dudley positive.  Rotaor cuff: Chan/Empty can positive, Liff-off/modified lift-off positive. Bear hug positive.  ACJ: Adduction cross body positive.  Motor: Grossly intact to Ax/MSC/R/U/M/AIN/PIN  Sensory: Grossly intact to Ax/MSC/R/U/M nerve distributions.  Vascular: 2+ radial pulse with brisk capillary refill into each digit.      Imaging/Labs/EMG Reviewed:  Imaging Results (Last 24 Hours)     ** No results found for the last 24 hours. **          Assessment:  1. Chronic right shoulder pain    2. Osteoarthritis of glenohumeral joint, right    3. Osteoarthritis of right acromioclavicular joint    4. Adhesive capsulitis of left shoulder        Plan:  1. Chronic right shoulder pain with significant glenohumeral osteoarthritis and mild ACJ osteoarthritis.  2. Offered and accepted a corticosteroid injection to the glenohumeral joint.  Injection was given today.  3. Patient scheduled for formal PT to the right shoulder.  4. Left shoulder adhesive capsulitis--improved following corticosteroid injection and formal PT.  Continue working on exercises taught by PT.  5. Recommend continue with over-the-counter pain medications as needed.  6. Follow-up 2 months for repeat evaluation, sooner if issues arise or symptoms worsen/change.  We discussed the possibility of an additional injection to the AC joint if she continues to note significant pain there as well as possible advanced imaging to assess rotator cuff competency and severity of GHJ OA.    After discussing the risks,  benefits, indications of injection, the patient gave consent to proceed.  Right shoulder was confirmed as the correct joint to be injected with a timeout.  It was then prepped using Hibiclens and injected with a mixture of 4 cc of 1% plain lidocaine and 1 cc of Kenalog (40 mg per mL), without any resistance through the posterior approach, patient in seated position.  Area was cleaned, hemostasis was achieved and a Band-Aid was applied over the injection site.  The patient tolerated procedure well.  I instructed the patient on signs and symptoms of infection.  They should report to the emergency department or return to clinic if any of these develop, for further evaluation and treatment.  Recommended modifying activity for the next 48 hours to include rest, ice, elevation and oral pain medication as needed.       Jennifer Nieves PA-C  12/16/19  12:30 PM

## 2019-12-13 NOTE — PROGRESS NOTES
Procedure   Large Joint Arthrocentesis: R glenohumeral  Date/Time: 12/13/2019 11:06 AM  Consent given by: patient  Site marked: site marked  Timeout: Immediately prior to procedure a time out was called to verify the correct patient, procedure, equipment, support staff and site/side marked as required   Supporting Documentation  Indications: pain   Procedure Details  Location: shoulder - R glenohumeral  Preparation: Patient was prepped and draped in the usual sterile fashion  Needle size: 22 G  Approach: anterior  Medications administered: 4 mL lidocaine PF 1% 1 %; 40 mg triamcinolone acetonide 40 MG/ML  Patient tolerance: patient tolerated the procedure well with no immediate complications

## 2019-12-16 RX ORDER — LIDOCAINE HYDROCHLORIDE 10 MG/ML
4 INJECTION, SOLUTION EPIDURAL; INFILTRATION; INTRACAUDAL; PERINEURAL
Status: COMPLETED | OUTPATIENT
Start: 2019-12-13 | End: 2019-12-13

## 2019-12-16 RX ORDER — TRIAMCINOLONE ACETONIDE 40 MG/ML
40 INJECTION, SUSPENSION INTRA-ARTICULAR; INTRAMUSCULAR
Status: COMPLETED | OUTPATIENT
Start: 2019-12-13 | End: 2019-12-13

## 2020-02-14 ENCOUNTER — TELEPHONE (OUTPATIENT)
Dept: ORTHOPEDIC SURGERY | Facility: CLINIC | Age: 55
End: 2020-02-14

## 2020-10-12 ENCOUNTER — OFFICE VISIT (OUTPATIENT)
Dept: INTERNAL MEDICINE | Facility: CLINIC | Age: 55
End: 2020-10-12

## 2020-10-12 ENCOUNTER — LAB (OUTPATIENT)
Dept: LAB | Facility: HOSPITAL | Age: 55
End: 2020-10-12

## 2020-10-12 VITALS
RESPIRATION RATE: 18 BRPM | HEART RATE: 73 BPM | HEIGHT: 59 IN | DIASTOLIC BLOOD PRESSURE: 84 MMHG | TEMPERATURE: 98 F | BODY MASS INDEX: 28.22 KG/M2 | WEIGHT: 140 LBS | SYSTOLIC BLOOD PRESSURE: 130 MMHG | OXYGEN SATURATION: 99 %

## 2020-10-12 DIAGNOSIS — I10 BENIGN ESSENTIAL HYPERTENSION: ICD-10-CM

## 2020-10-12 DIAGNOSIS — E03.9 HYPOTHYROIDISM (ACQUIRED): ICD-10-CM

## 2020-10-12 DIAGNOSIS — Z11.59 NEED FOR HEPATITIS C SCREENING TEST: ICD-10-CM

## 2020-10-12 DIAGNOSIS — Z13.31 DEPRESSION SCREENING NEGATIVE: ICD-10-CM

## 2020-10-12 DIAGNOSIS — Z00.00 ANNUAL PHYSICAL EXAM: Primary | ICD-10-CM

## 2020-10-12 DIAGNOSIS — Z23 NEED FOR VACCINATION: ICD-10-CM

## 2020-10-12 DIAGNOSIS — Z00.00 ANNUAL PHYSICAL EXAM: ICD-10-CM

## 2020-10-12 LAB
BILIRUB BLD-MCNC: NEGATIVE MG/DL
CLARITY, POC: CLEAR
COLOR UR: YELLOW
GLUCOSE UR STRIP-MCNC: NEGATIVE MG/DL
KETONES UR QL: NEGATIVE
LEUKOCYTE EST, POC: ABNORMAL
NITRITE UR-MCNC: NEGATIVE MG/ML
PH UR: 5.5 [PH] (ref 5–8)
PROT UR STRIP-MCNC: NEGATIVE MG/DL
RBC # UR STRIP: NEGATIVE /UL
SP GR UR: 1.01 (ref 1–1.03)
UROBILINOGEN UR QL: NORMAL

## 2020-10-12 PROCEDURE — 99396 PREV VISIT EST AGE 40-64: CPT | Performed by: NURSE PRACTITIONER

## 2020-10-12 PROCEDURE — 36415 COLL VENOUS BLD VENIPUNCTURE: CPT

## 2020-10-12 PROCEDURE — 84443 ASSAY THYROID STIM HORMONE: CPT

## 2020-10-12 PROCEDURE — 86803 HEPATITIS C AB TEST: CPT

## 2020-10-12 PROCEDURE — 90471 IMMUNIZATION ADMIN: CPT | Performed by: NURSE PRACTITIONER

## 2020-10-12 PROCEDURE — 80053 COMPREHEN METABOLIC PANEL: CPT

## 2020-10-12 PROCEDURE — 85027 COMPLETE CBC AUTOMATED: CPT

## 2020-10-12 PROCEDURE — 90686 IIV4 VACC NO PRSV 0.5 ML IM: CPT | Performed by: NURSE PRACTITIONER

## 2020-10-12 PROCEDURE — 80061 LIPID PANEL: CPT

## 2020-10-12 PROCEDURE — 81003 URINALYSIS AUTO W/O SCOPE: CPT | Performed by: NURSE PRACTITIONER

## 2020-10-12 RX ORDER — BUPROPION HYDROCHLORIDE 300 MG/1
300 TABLET ORAL DAILY
COMMUNITY

## 2020-10-12 RX ORDER — CLONIDINE HYDROCHLORIDE 0.1 MG/1
0.1 TABLET ORAL 2 TIMES DAILY PRN
COMMUNITY

## 2020-10-12 RX ORDER — BUPRENORPHINE HYDROCHLORIDE AND NALOXONE HYDROCHLORIDE DIHYDRATE 8; 2 MG/1; MG/1
1 TABLET SUBLINGUAL DAILY
COMMUNITY

## 2020-10-12 NOTE — PROGRESS NOTES
Patient Care Team:  Lainey Mcmahon MD as PCP - General (Internal Medicine)  Lainey Mcmahon MD as PCP - Family Medicine  Ese Larson MD (Pain Medicine)     Chief complaint: Patient is in today for a physical          Patient is a 55 y.o. female who presents for her yearly physical exam.     HPI    hypothyroid- stable, last tsh was NL in 12/2019. On levothyroxine daily. Has trouble losing weight.    Lab Results   Component Value Date    TSH 0.653 12/11/2019       Elevated BP. Mildly up in office. Runs 130-140 at home.   Was on meds in the past. None currently   Denies headaches, dizziness, visual disturbances, palpitations chest pain, dyspnea, TIA or CVA symptoms, leg pain/claudication symptoms, and edema.     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         No LMP recorded (lmp unknown). Patient has had a hysterectomy.      Mammogram: 8/2019, DUE   has every 6 months, had had left breast bx, and was negative, still needs Q6 m.     Self breast exam: does regularly, denies concerning area.   Colonoscopy:4/2019, Dr. Vazquez, repeat at 10 y    Eye exam: UTD, wears glasses.   Dental exam: UTD  Sleep: sleeps ok.     Sunscreen use: wears   Seatbelt use: wears     Diet: eats pretty healthy, eats out rarely, healthy snacks,   Caffeine- 1-2 per day, drinks lots of water.   Exercise: walks dogs 1-2 times everyday    Social History     Tobacco Use   Smoking Status Never Smoker   Smokeless Tobacco Never Used     Social History     Substance and Sexual Activity   Alcohol Use Yes     Comment: 3-4/week       PHQ-2 Depression Screening  Little interest or pleasure in doing things? 0   Feeling down, depressed, or hopeless? 0   PHQ-2 Total Score 0           Review of Systems   Constitutional: Negative for activity change, appetite change, chills, diaphoresis, fatigue, fever, unexpected weight gain and unexpected weight loss.        C/o difficulty losing weight   HENT: Negative.  Negative for voice change.    Eyes: Negative for pain and visual disturbance.   Respiratory: Negative for cough, chest tightness, shortness of breath and wheezing.    Cardiovascular: Negative for chest pain, palpitations and leg swelling.   Gastrointestinal: Negative for abdominal distention, abdominal pain, constipation (occasional ), diarrhea, nausea, rectal pain, vomiting, GERD and indigestion.   Endocrine: Negative for cold intolerance, heat intolerance, polydipsia, polyphagia and polyuria.   Genitourinary: Negative.  Negative for difficulty urinating, dysuria, flank pain, frequency, urgency, vaginal bleeding, vaginal discharge and vaginal pain.   Musculoskeletal: Negative.  Negative for arthralgias and myalgias.   Skin: Negative for color change, dry skin and rash.   Neurological: Negative for dizziness, facial asymmetry, weakness, light-headedness, numbness, headache and confusion.   Hematological: Does not bruise/bleed easily.   Psychiatric/Behavioral: Negative for decreased concentration and sleep disturbance. The patient is not nervous/anxious.    All other systems reviewed and are negative.        History  Past Medical History:   Diagnosis Date   • Abnormal EKG 2012    Dr Ceballos   • Alpha thalassemia (CMS/HCC)    • Anxiety    • Cyst of thyroid determined by ultrasound 10/2013    2 small colloid cysts, otherwise nml   • [facility]ogram 2017    [facility]   • Hemorrhoid    • Herpes simplex    • History of MRI of lumbar spine 01/2015    deg, post surgical changes   • Hx MRSA infection 06/2014    R leg   • Hx of mammogram  2014    dx'ic - probable benign calcifications, repeat in 6m   • Hypertension    • LBBB (left bundle branch block) 2014   • Microcytic anemia    • Pap smear for cervical cancer screening 2017    Dr. Pugh   • Plantar fasciitis of left foot    • Restless legs syndrome (RLS)    • Routine general medical examination at a health care facility    • Uncomplicated opioid dependence (CMS/HCC)       Past Surgical History:   Procedure Laterality Date   • BREAST BIOPSY Left 2017    stereo bx left breast.    • CARDIAC CATHETERIZATION  2012    negative. Dr. Ceballos   • EYE MUSCLE SURGERY Left 01/2018   • KNEE SURGERY Right 2003    torn cartilage   • LAMINECTOMY  07/2009    L3 (Dr. Negro)   • LEEP  1995    cervical dysplasia   • LUMBAR DISCECTOMY  07/2009    Dr. Negro. L3-L4   • OOPHORECTOMY     • SUPRACERVICAL HYSTERECTOMY SALPINGO OOPHORECTOMY  2009    L SO      Allergies   Allergen Reactions   • Vitamin B12 Rash     Rash and nausea and vomiting      Family History   Problem Relation Age of Onset   • Hypertension Mother    • Anemia Mother    • Lymphoma Mother    • Hyperlipidemia Mother    • Liver cancer Father    • Diabetes Father    • Breast cancer Maternal Grandmother 57   • Stroke Maternal Grandmother    • Ovarian cancer Paternal Aunt 47     Social History     Socioeconomic History   • Marital status:      Spouse name: Not on file   • Number of children: Not on file   • Years of education: Not on file   • Highest education level: Not on file   Tobacco Use   • Smoking status: Never Smoker   • Smokeless tobacco: Never Used   Substance and Sexual Activity   • Alcohol use: Yes     Comment: 3-4/week   • Drug use: No   • Sexual activity: Not Currently        Current Outpatient Medications:   •  acyclovir (ZOVIRAX) 400 MG tablet, 1 tid x 5 days prn breakout; 1 bid between breakouts, Disp: 90 tablet, Rfl: 11  •  buprenorphine-naloxone (SUBOXONE) 8-2 MG per SL tablet, Place 1 tablet under the tongue Daily., Disp: , Rfl:  "  •  buPROPion XL (WELLBUTRIN XL) 300 MG 24 hr tablet, Take 300 mg by mouth Daily., Disp: , Rfl:   •  cloNIDine (CATAPRES) 0.1 MG tablet, Take 0.1 mg by mouth 2 (Two) Times a Day As Needed for High Blood Pressure., Disp: , Rfl:   •  escitalopram (LEXAPRO) 10 MG tablet, Take 1 tablet by mouth Daily. (Patient taking differently: Take 20 mg by mouth Daily.), Disp: 30 tablet, Rfl: 5  •  levothyroxine (SYNTHROID, LEVOTHROID) 50 MCG tablet, Take 1 tablet by mouth Daily., Disp: 30 tablet, Rfl: 11                  /84   Pulse 73   Temp 98 °F (36.7 °C)   Resp 18   Ht 149.9 cm (59\")   Wt 63.5 kg (140 lb)   LMP  (LMP Unknown)   SpO2 99%   BMI 28.28 kg/m²       Physical Exam  Vitals signs and nursing note reviewed.   Constitutional:       General: She is not in acute distress.     Appearance: Normal appearance. She is well-developed and overweight. She is not diaphoretic.   HENT:      Head: Normocephalic and atraumatic.      Right Ear: Hearing, tympanic membrane, ear canal and external ear normal.      Left Ear: Hearing, tympanic membrane, ear canal and external ear normal.   Eyes:      General: Lids are normal. Vision grossly intact. Gaze aligned appropriately. No allergic shiner, visual field deficit or scleral icterus.        Right eye: No discharge.         Left eye: No discharge.      Extraocular Movements: Extraocular movements intact.      Conjunctiva/sclera: Conjunctivae normal.      Pupils: Pupils are equal, round, and reactive to light.   Neck:      Musculoskeletal: Normal range of motion and neck supple.      Thyroid: No thyroid mass, thyromegaly or thyroid tenderness.      Vascular: Normal carotid pulses. No carotid bruit or JVD.      Trachea: No tracheal deviation.   Cardiovascular:      Rate and Rhythm: Normal rate and regular rhythm.      Chest Wall: PMI is not displaced. No thrill.      Pulses:           Radial pulses are 2+ on the right side and 2+ on the left side.        Dorsalis pedis pulses " are 2+ on the right side and 2+ on the left side.        Posterior tibial pulses are 2+ on the right side and 2+ on the left side.      Heart sounds: Normal heart sounds. No murmur. No friction rub. No gallop.    Pulmonary:      Effort: Pulmonary effort is normal. No respiratory distress.      Breath sounds: Normal breath sounds. No wheezing or rales.   Chest:      Chest wall: No tenderness.      Breasts: Breasts are symmetrical.         Right: Normal. No swelling, bleeding, inverted nipple, mass, nipple discharge, skin change or tenderness.         Left: Normal. No swelling, bleeding, inverted nipple, mass, nipple discharge, skin change or tenderness.   Abdominal:      General: Bowel sounds are normal. There is no distension.      Palpations: Abdomen is soft. There is no mass.      Tenderness: There is no abdominal tenderness.      Hernia: No hernia is present.   Musculoskeletal: Normal range of motion.         General: No tenderness or deformity.      Right lower leg: No edema.      Left lower leg: No edema.   Lymphadenopathy:      Head:      Right side of head: No submental, submandibular, tonsillar, preauricular, posterior auricular or occipital adenopathy.      Left side of head: No submental, submandibular, tonsillar, preauricular, posterior auricular or occipital adenopathy.      Cervical: No cervical adenopathy.   Skin:     General: Skin is warm and dry.      Coloration: Skin is not pale.      Findings: No erythema or rash.   Neurological:      Mental Status: She is alert and oriented to person, place, and time.      Deep Tendon Reflexes: Reflexes are normal and symmetric. Reflexes normal.   Psychiatric:         Attention and Perception: Attention normal.         Mood and Affect: Mood normal.         Speech: Speech normal.         Behavior: Behavior normal.         Thought Content: Thought content normal.                   Diagnoses and all orders for this visit:    1. Annual physical exam (Primary)  -      Hepatitis C Antibody; Future  -     CBC (No Diff); Future  -     Comprehensive Metabolic Panel; Future  -     TSH Rfx On Abnormal To Free T4; Future  -     POCT urinalysis dipstick, automated  -     Lipid Panel; Future    2. Hypothyroidism (acquired)  -     CBC (No Diff); Future  -     Comprehensive Metabolic Panel; Future  -     TSH Rfx On Abnormal To Free T4; Future  Check TSH,   Cont levothryoxine  3. Benign essential hypertension  -     CBC (No Diff); Future  -     Comprehensive Metabolic Panel; Future  -     TSH Rfx On Abnormal To Free T4; Future  -     Lipid Panel; Future  Monitor BP at home. Goal < 130/80. Recheck with PCP in 1 m to see if needs meds added back on     4. Need for hepatitis C screening test  -     Hepatitis C Antibody; Future    5. Need for vaccination  -     FluLaval Quad >6 Months (7151-4153)    6. BMI 28.0-28.9,adult    7. Depression screening negative     Labs  ordered as above- will notify of results and treat accordingly. If patient has not received results within one week, they will notify my office  Immunizations and screenings  Hep c screen, lipid screen,  breast exam completed, no more pap due to hysterectomy, she will schedule her mammogram (contact info given), flu vaccine updated, she will think about shingles vaccine and can get at pharmacy if wants, others UTD as note din HPI  Counseling: The patient is advised to attempt to lose weight, improve dietary compliance and return for routine annual checkups.  Follow up: Return in about 1 month (around 11/12/2020) for Recheck BP, Labs today.  Plan of care discussed with pt. They verbalized understanding and agreement.     Bharti Loco, APRN   10/12/2020   14:56 EDT              * Please note that portions of this note were completed with a voice recognition program. Efforts were made to edit the dictation but occasionally words are erroneously transcribed.

## 2020-10-13 ENCOUNTER — TRANSCRIBE ORDERS (OUTPATIENT)
Dept: INTERNAL MEDICINE | Facility: CLINIC | Age: 55
End: 2020-10-13

## 2020-10-13 DIAGNOSIS — R92.8 ABNORMAL MAMMOGRAPHY: Primary | ICD-10-CM

## 2020-10-13 LAB
ALBUMIN SERPL-MCNC: 4.4 G/DL (ref 3.5–5.2)
ALBUMIN/GLOB SERPL: 1.5 G/DL
ALP SERPL-CCNC: 94 U/L (ref 39–117)
ALT SERPL W P-5'-P-CCNC: 11 U/L (ref 1–33)
ANION GAP SERPL CALCULATED.3IONS-SCNC: 10.2 MMOL/L (ref 5–15)
AST SERPL-CCNC: 15 U/L (ref 1–32)
BILIRUB SERPL-MCNC: 0.2 MG/DL (ref 0–1.2)
BUN SERPL-MCNC: 13 MG/DL (ref 6–20)
BUN/CREAT SERPL: 16.9 (ref 7–25)
CALCIUM SPEC-SCNC: 9.5 MG/DL (ref 8.6–10.5)
CHLORIDE SERPL-SCNC: 103 MMOL/L (ref 98–107)
CHOLEST SERPL-MCNC: 150 MG/DL (ref 0–200)
CO2 SERPL-SCNC: 25.8 MMOL/L (ref 22–29)
CREAT SERPL-MCNC: 0.77 MG/DL (ref 0.57–1)
DEPRECATED RDW RBC AUTO: 41 FL (ref 37–54)
ERYTHROCYTE [DISTWIDTH] IN BLOOD BY AUTOMATED COUNT: 17.2 % (ref 12.3–15.4)
GFR SERPL CREATININE-BSD FRML MDRD: 78 ML/MIN/1.73
GLOBULIN UR ELPH-MCNC: 3 GM/DL
GLUCOSE SERPL-MCNC: 75 MG/DL (ref 65–99)
HCT VFR BLD AUTO: 34.8 % (ref 34–46.6)
HCV AB SER DONR QL: NORMAL
HDLC SERPL-MCNC: 40 MG/DL (ref 40–60)
HGB BLD-MCNC: 9.8 G/DL (ref 12–15.9)
LDLC SERPL CALC-MCNC: 82 MG/DL (ref 0–100)
LDLC/HDLC SERPL: 1.95 {RATIO}
MCH RBC QN AUTO: 19 PG (ref 26.6–33)
MCHC RBC AUTO-ENTMCNC: 28.2 G/DL (ref 31.5–35.7)
MCV RBC AUTO: 67.3 FL (ref 79–97)
PLATELET # BLD AUTO: 437 10*3/MM3 (ref 140–450)
PMV BLD AUTO: 9.3 FL (ref 6–12)
POTASSIUM SERPL-SCNC: 4.2 MMOL/L (ref 3.5–5.2)
PROT SERPL-MCNC: 7.4 G/DL (ref 6–8.5)
RBC # BLD AUTO: 5.17 10*6/MM3 (ref 3.77–5.28)
SODIUM SERPL-SCNC: 139 MMOL/L (ref 136–145)
TRIGL SERPL-MCNC: 160 MG/DL (ref 0–150)
TSH SERPL DL<=0.05 MIU/L-ACNC: 0.55 UIU/ML (ref 0.27–4.2)
VLDLC SERPL-MCNC: 28 MG/DL (ref 5–40)
WBC # BLD AUTO: 8.76 10*3/MM3 (ref 3.4–10.8)

## 2020-10-22 ENCOUNTER — HOSPITAL ENCOUNTER (OUTPATIENT)
Dept: MAMMOGRAPHY | Facility: HOSPITAL | Age: 55
Discharge: HOME | End: 2020-10-22
Admitting: INTERNAL MEDICINE

## 2020-10-22 DIAGNOSIS — R92.8 ABNORMAL MAMMOGRAPHY: ICD-10-CM

## 2020-10-22 PROCEDURE — 77066 DX MAMMO INCL CAD BI: CPT

## 2020-10-22 PROCEDURE — 77066 DX MAMMO INCL CAD BI: CPT | Performed by: RADIOLOGY

## 2020-10-22 PROCEDURE — G0279 TOMOSYNTHESIS, MAMMO: HCPCS

## 2020-10-22 PROCEDURE — 77062 BREAST TOMOSYNTHESIS BI: CPT | Performed by: RADIOLOGY

## 2020-11-09 RX ORDER — FLUCONAZOLE 150 MG/1
150 TABLET ORAL ONCE
Qty: 1 TABLET | Refills: 0 | Status: SHIPPED | OUTPATIENT
Start: 2020-11-09 | End: 2020-11-09

## 2020-11-12 ENCOUNTER — OFFICE VISIT (OUTPATIENT)
Dept: INTERNAL MEDICINE | Facility: CLINIC | Age: 55
End: 2020-11-12

## 2020-11-12 VITALS
WEIGHT: 143 LBS | HEART RATE: 70 BPM | BODY MASS INDEX: 28.83 KG/M2 | SYSTOLIC BLOOD PRESSURE: 132 MMHG | HEIGHT: 59 IN | DIASTOLIC BLOOD PRESSURE: 76 MMHG | TEMPERATURE: 97.1 F | OXYGEN SATURATION: 98 %

## 2020-11-12 DIAGNOSIS — B00.1 FEVER BLISTER: ICD-10-CM

## 2020-11-12 DIAGNOSIS — K64.1 GRADE II HEMORRHOIDS: ICD-10-CM

## 2020-11-12 DIAGNOSIS — N89.8 VAGINAL DISCHARGE: Primary | ICD-10-CM

## 2020-11-12 PROCEDURE — 99214 OFFICE O/P EST MOD 30 MIN: CPT | Performed by: INTERNAL MEDICINE

## 2020-11-12 RX ORDER — FLUCONAZOLE 150 MG/1
150 TABLET ORAL ONCE
Qty: 1 TABLET | Refills: 0 | Status: SHIPPED | OUTPATIENT
Start: 2020-11-12 | End: 2020-11-12

## 2020-11-12 NOTE — PROGRESS NOTES
Subjective   Bhavani Coon is a 55 y.o. female.     History of Present Illness     Here for f/u on:    Vaginal burning and itching over the last 6 or 7 days- She took a diflcan 3 days ago, and does feel like it helps, but still w/ some burning over last 4-5 days.  Not much discharge.  She is  and is monogamous.  She does not think her  has had any other partners    Hemorrhoids -started acting up around the same time the vaginal burning started.  She has itching/swelling and has been using prep H which does help some.  She started a stool softener as well  She did have colon done and saw Dr Vazquez in 2019 and they discussed hemorrhoid surgery but patient declined at the time    Corner of mouth has a fever blister for 3 days. This has been the largest she has had. no vaginal lesions  She is on acyclovir, which supposed to be twice daily as prevention and 3 times daily when she has an outbreak but she has been using it 3 times daily fairly regularly.  Gets outbreaks occasionally but not very often.  She does have a history of genital herpes as well.  She has been under a lot of stress moving so she feels like it may be because of this.    Current Outpatient Medications on File Prior to Visit   Medication Sig Dispense Refill   • acyclovir (ZOVIRAX) 400 MG tablet 1 tid x 5 days prn breakout; 1 bid between breakouts 90 tablet 11   • buprenorphine-naloxone (SUBOXONE) 8-2 MG per SL tablet Place 1 tablet under the tongue Daily.     • buPROPion XL (WELLBUTRIN XL) 300 MG 24 hr tablet Take 300 mg by mouth Daily.     • cloNIDine (CATAPRES) 0.1 MG tablet Take 0.1 mg by mouth 2 (Two) Times a Day As Needed for High Blood Pressure.     • escitalopram (LEXAPRO) 10 MG tablet Take 1 tablet by mouth Daily. (Patient taking differently: Take 20 mg by mouth Daily.) 30 tablet 5   • levothyroxine (SYNTHROID, LEVOTHROID) 50 MCG tablet Take 1 tablet by mouth Daily. 30 tablet 11     No current facility-administered  "medications on file prior to visit.        The following portions of the patient's history were reviewed and updated as appropriate: allergies, current medications, past family history, past medical history, past social history, past surgical history and problem list.    Review of Systems   Constitutional: Negative for activity change, appetite change, unexpected weight gain and unexpected weight loss.   HENT: Positive for mouth sores. Negative for hearing loss.    Genitourinary: Positive for vaginal pain. Negative for genital sores and vaginal discharge.   Allergic/Immunologic: Negative for immunocompromised state.   Neurological: Negative for seizures and speech difficulty.   Psychiatric/Behavioral: Positive for stress.       Objective   /76 (BP Location: Right arm, Patient Position: Sitting)   Pulse 70   Temp 97.1 °F (36.2 °C) (Infrared)   Ht 149.9 cm (59\")   Wt 64.9 kg (143 lb)   LMP  (LMP Unknown)   SpO2 98%   BMI 28.88 kg/m²   Physical Exam  Constitutional:       General: She is not in acute distress.     Appearance: She is well-developed. She is not diaphoretic.   HENT:      Head: Normocephalic and atraumatic.      Mouth/Throat:      Mouth: Mucous membranes are moist.      Comments: Healing fever blister on the corner of the right side of mouth  Eyes:      Conjunctiva/sclera: Conjunctivae normal.   Cardiovascular:      Rate and Rhythm: Normal rate and regular rhythm.      Heart sounds: Normal heart sounds. No murmur. No friction rub. No gallop.    Pulmonary:      Effort: Pulmonary effort is normal. No respiratory distress.      Breath sounds: Normal breath sounds. No wheezing.   Genitourinary:     General: Normal vulva.      Vagina: Vaginal discharge (.  Small amount of white discharge inside vagina) present.      Comments: Some white discharge inside vagina but no herpetic lesions seen.  Large external hemorrhoid that is tender  Skin:     General: Skin is warm and dry.   Neurological:      " Mental Status: She is alert and oriented to person, place, and time.   Psychiatric:         Behavior: Behavior normal.         Thought Content: Thought content normal.         Judgment: Judgment normal.           Assessment/Plan   Diagnoses and all orders for this visit:    1. Vaginal discharge (Primary)-we will give patient 1 more Diflucan and do a new swab today  -     NuSwab BV & Candida - Swab, Vagina; Future    2. Grade II hemorrhoids-we discussed adding Metamucil and doing sitz bath's.  She declines referral to colorectal surgeons but if symptoms worsen she will call.  Okay to continue the Preparation H as well    3. Fever blister-probably occurred despite the acyclovir because of recent stress.  We did discuss switching over to Valtrex to see if that helps prevent them more frequently.  She just filled the acyclovir so she will finish this out and then let me know    Other orders  -     fluconazole (Diflucan) 150 MG tablet; Take 1 tablet by mouth 1 (One) Time for 1 dose.  Dispense: 1 tablet; Refill: 0

## 2020-11-13 ENCOUNTER — OFFICE VISIT (OUTPATIENT)
Dept: ORTHOPEDIC SURGERY | Facility: CLINIC | Age: 55
End: 2020-11-13

## 2020-11-13 ENCOUNTER — LAB (OUTPATIENT)
Dept: LAB | Facility: HOSPITAL | Age: 55
End: 2020-11-13

## 2020-11-13 VITALS — BODY MASS INDEX: 28.84 KG/M2 | OXYGEN SATURATION: 98 % | HEIGHT: 59 IN | WEIGHT: 143.08 LBS | HEART RATE: 87 BPM

## 2020-11-13 DIAGNOSIS — M25.532 PAIN IN BOTH WRISTS: ICD-10-CM

## 2020-11-13 DIAGNOSIS — M25.512 CHRONIC PAIN OF BOTH SHOULDERS: Primary | ICD-10-CM

## 2020-11-13 DIAGNOSIS — G89.29 CHRONIC PAIN OF BOTH SHOULDERS: Primary | ICD-10-CM

## 2020-11-13 DIAGNOSIS — N89.8 VAGINAL DISCHARGE: ICD-10-CM

## 2020-11-13 DIAGNOSIS — M19.011 OSTEOARTHRITIS OF BOTH GLENOHUMERAL JOINTS: ICD-10-CM

## 2020-11-13 DIAGNOSIS — R20.2 NUMBNESS AND TINGLING IN BOTH HANDS: ICD-10-CM

## 2020-11-13 DIAGNOSIS — M25.531 PAIN IN BOTH WRISTS: ICD-10-CM

## 2020-11-13 DIAGNOSIS — M19.012 OSTEOARTHRITIS OF BOTH GLENOHUMERAL JOINTS: ICD-10-CM

## 2020-11-13 DIAGNOSIS — R20.0 NUMBNESS AND TINGLING IN BOTH HANDS: ICD-10-CM

## 2020-11-13 DIAGNOSIS — M25.511 CHRONIC PAIN OF BOTH SHOULDERS: Primary | ICD-10-CM

## 2020-11-13 PROCEDURE — 87801 DETECT AGNT MULT DNA AMPLI: CPT | Performed by: INTERNAL MEDICINE

## 2020-11-13 PROCEDURE — 20610 DRAIN/INJ JOINT/BURSA W/O US: CPT | Performed by: PHYSICIAN ASSISTANT

## 2020-11-13 PROCEDURE — 99213 OFFICE O/P EST LOW 20 MIN: CPT | Performed by: PHYSICIAN ASSISTANT

## 2020-11-13 PROCEDURE — 87798 DETECT AGENT NOS DNA AMP: CPT | Performed by: INTERNAL MEDICINE

## 2020-11-13 RX ORDER — MELOXICAM 15 MG/1
TABLET ORAL
Qty: 90 TABLET | Refills: 2 | Status: SHIPPED | OUTPATIENT
Start: 2020-11-13 | End: 2022-12-14

## 2020-11-13 RX ADMIN — LIDOCAINE HYDROCHLORIDE 4 ML: 10 INJECTION, SOLUTION EPIDURAL; INFILTRATION; INTRACAUDAL; PERINEURAL at 09:50

## 2020-11-13 RX ADMIN — TRIAMCINOLONE ACETONIDE 40 MG: 40 INJECTION, SUSPENSION INTRA-ARTICULAR; INTRAMUSCULAR at 09:50

## 2020-11-13 RX ADMIN — TRIAMCINOLONE ACETONIDE 40 MG: 40 INJECTION, SUSPENSION INTRA-ARTICULAR; INTRAMUSCULAR at 09:51

## 2020-11-13 RX ADMIN — LIDOCAINE HYDROCHLORIDE 4 ML: 10 INJECTION, SOLUTION EPIDURAL; INFILTRATION; INTRACAUDAL; PERINEURAL at 09:51

## 2020-11-13 NOTE — PROGRESS NOTES
[facility] [place] [facility] [place]    Subjective     Chief Complaint   Patient presents with   • Right Shoulder - Follow-up     11 month   • Left Shoulder - Pain        HPI  Bhavani Coon is a 55 y.o. female.  Patient returns today for follow-up bilateral shoulder pain.  She has a history of glenohumeral osteoarthritis to both shoulders.  She is undergone corticosteroid injections to the glenohumeral joints left in November 2019 and right in December 2019.  With these injections she notes near 100% pain relief for approximately 6 months and then pain returns.  Patient reports that for the last 2 months she has been in intense worsening pain.  She is noted limited range of motion with the pain.    Currently she endorses a pain scale of 7/10.  Severity the pain moderate to severe.  Quality the pain aching, throbbing, shooting.  Associated symptoms popping, grinding, stiffness.    She also reports that for the last 6 weeks she has been noticing increased pain in bilateral forearms with tingling into bilateral hands.    Overall, patient's symptoms improved following corticosteroid injections currently they are worse with increasing pain.    No reported fever, chills, night sweats or other constitutional symptoms.    Past Medical History:   Diagnosis Date   • Abnormal EKG 2012    Dr Ceballos   • Alpha thalassemia (CMS/HCC)    • Anxiety    • Cyst of thyroid determined by ultrasound 10/2013    2 small colloid cysts, otherwise nml   • H/O mammogram 2017    [facility]   • Hemorrhoid    • Herpes simplex    • History of MRI of lumbar spine 01/2015    deg, post surgical changes   • Hx MRSA infection 06/2014    R leg   • Hx of mammogram 2014    dx'ic - probable benign calcifications, repeat in 6m   • Hypertension    • LBBB (left bundle branch block) 2014   • Microcytic anemia    • Pap smear for cervical cancer screening 2017    Dr. Pugh   • Plantar fasciitis of left foot    • Restless legs syndrome (RLS)    •  Routine general medical examination at a health care facility    • Uncomplicated opioid dependence (CMS/HCC)       Past Surgical History:   Procedure Laterality Date   • BREAST BIOPSY Left 2017    stereo bx left breast.    • CARDIAC CATHETERIZATION  2012    negative. Dr. Ceballos   • EYE MUSCLE SURGERY Left 01/2018   • KNEE SURGERY Right 2003    torn cartilage   • LAMINECTOMY  07/2009    L3 (Dr. Negro)   • LEEP  1995    cervical dysplasia   • LUMBAR DISCECTOMY  07/2009    Dr. Negro. L3-L4   • OOPHORECTOMY     • SUPRACERVICAL HYSTERECTOMY SALPINGO OOPHORECTOMY  2009    L SO      Family History   Problem Relation Age of Onset   • Hypertension Mother    • Anemia Mother    • Lymphoma Mother    • Hyperlipidemia Mother    • Liver cancer Father    • Diabetes Father    • Breast cancer Maternal Grandmother 57   • Stroke Maternal Grandmother    • Ovarian cancer Paternal Aunt 47     Social History     Socioeconomic History   • Marital status:      Spouse name: Not on file   • Number of children: Not on file   • Years of education: Not on file   • Highest education level: Not on file   Tobacco Use   • Smoking status: Never Smoker   • Smokeless tobacco: Never Used   Substance and Sexual Activity   • Alcohol use: Yes     Comment: 3-4/week   • Drug use: No   • Sexual activity: Not Currently      Current Outpatient Medications on File Prior to Visit   Medication Sig Dispense Refill   • acyclovir (ZOVIRAX) 400 MG tablet 1 tid x 5 days prn breakout; 1 bid between breakouts 90 tablet 11   • buprenorphine-naloxone (SUBOXONE) 8-2 MG per SL tablet Place 1 tablet under the tongue Daily.     • buPROPion XL (WELLBUTRIN XL) 300 MG 24 hr tablet Take 300 mg by mouth Daily.     • cloNIDine (CATAPRES) 0.1 MG tablet Take 0.1 mg by mouth 2 (Two) Times a Day As Needed for High Blood Pressure.     • escitalopram (LEXAPRO) 10 MG tablet Take 1 tablet by mouth Daily. (Patient taking differently: Take 20 mg by mouth Daily.) 30 tablet 5   •  "levothyroxine (SYNTHROID, LEVOTHROID) 50 MCG tablet Take 1 tablet by mouth Daily. 30 tablet 11     No current facility-administered medications on file prior to visit.       Allergies   Allergen Reactions   • Vitamin B12 Rash     Rash and nausea and vomiting        The following portions of the patient's history were reviewed and updated as appropriate: allergies, current medications, past family history, past medical history, past social history, past surgical history and problem list.    Review of Systems   Constitutional: Negative.    HENT: Negative.    Eyes: Negative.    Respiratory: Negative.    Cardiovascular: Negative.    Gastrointestinal: Negative.    Endocrine: Negative.    Genitourinary: Negative.    Musculoskeletal: Positive for arthralgias.   Skin: Negative.    Allergic/Immunologic: Negative.    Neurological: Negative.    Hematological: Negative.    Psychiatric/Behavioral: Negative.         Objective      Physical Exam  Pulse 87   Ht 149.9 cm (59.02\")   Wt 64.9 kg (143 lb 1.3 oz)   LMP  (LMP Unknown)   SpO2 98%   BMI 28.88 kg/m²     Body mass index is 28.88 kg/m².          Ortho Exam  Bilateral shoulders  Skin: Grossly intact without any redness, warmth or swelling.  Tenderness: Patient notes pain deep inside shoulder but currently also endorses global tenderness externally as well.  Pain is also noted to bilateral ACJ's.  Motion: , ER 45, IR L5  Rotator cuff: Chan/empty can slight discomfort with strength to the supraspinatus of 5/5.  Lift off/modified liftoff negative.  Bear hug negative.    Bilateral wrists  Skin: Grossly intact without any redness, warmth or swelling.  Testing: Durken's positive with pain radiating in both thumbs.  Tinel's negative, Phalen's positive with numbness noted radial 3 digits.      Imaging/Studies  Ordered bilateral shoulders plain films.  Imaging read by Dr. Rueda.    Imaging Results (Last 7 Days)     Procedure Component Value Units Date/Time    XR Shoulder 2+ " View Bilateral [[id number]] Resulted: 11/17/20 1042     Updated: 11/17/20 1043    Narrative:      Imaging: BILATERAL shoulder x-rays 2 views - AP and axillary x-ray views    Side: BILATERAL     Indication for BILATERAL  shoulder x-ray 2 views: BILATERAL shoulder pain    Comparison: prior clinic xrays reviewed    BILATERAL SHOULDER Findings:   Bilateral glenohumeral joint arthritis right worse than left.  The right   has a large humeral head osteophyte posterior subluxation, posterior   glenoid subchondral cyst formation.  Posterior subluxation and findings of   dysplastic features with some biconcavity on the right.  The left shows a   smaller humeral head osteophyte but some baseline posterior subluxation as   well.  The left has some joint space preservation more so than the right.    The right has complete absence of joint space on the axillary film.    I personally reviewed the above x-rays and discussed with the patient.          Assessment/Plan        ICD-10-CM ICD-9-CM   1. Chronic pain of both shoulders  M25.511 719.41    G89.29 338.29    M25.512    2. Osteoarthritis of both glenohumeral joints  M19.011 715.91    M19.012    3. Pain in both wrists  M25.531 719.43    M25.532    4. Numbness and tingling in both hands  R20.0 782.0    R20.2        Orders Placed This Encounter   Procedures   • Large Joint Arthrocentesis: R glenohumeral   • Large Joint Arthrocentesis: L glenohumeral   • XR Shoulder 2+ View Bilateral        -Chronic bilateral shoulder pain due to worsening glenohumeral osteoarthritis, right greater than left.  -Patient has responded well to glenohumeral corticosteroid injections.  -Patient was offered accepted repeat glenohumeral corticosteroid injection, bilaterally.  Injections were given today.  -Patient to continue working on gentle range of motion.  -Bilateral wrist pain with numbness and tingling into both hands--provided bilateral cock-up wrist splints.    -Patient reports in the past she has  taken both ibuprofen and Aleve.  I informed her that these are both anti-inflammatories.  She is to DC both.  -Prescribed meloxicam a daily anti-inflammatory.  If she has breakthrough pain she may take Tylenol as needed.  -Follow-up in 6 weeks for repeat evaluation, sooner if issues arise or symptoms worsen/change.  If she continues to be symptomatic to the wrist with the numbness and tingling we discussed proceeding with EMG/NCS.  -Questions and concerns answered.    After discussing the risks, benefits, indications of injection, the patient gave consent to proceed.  Bilateral shoulder were confirmed as the correct joints to be injected with a timeout.  Each was then prepped using Hibiclens and injected with a mixture of 4 cc of 1% plain lidocaine and 1 cc of Kenalog (40 mg per mL), without any resistance through the posterior approach, patient in seated position.  Area was cleaned, hemostasis was achieved and a Band-Aid was applied over the injection site.  The patient tolerated procedure well.  I instructed the patient on signs and symptoms of infection.  They should report to the emergency department or return to clinic if any of these develop, for further evaluation and treatment.  Recommended modifying activity for the next 48 hours to include rest, ice, elevation and oral pain medication as needed.        Medical Decision Making  Management Options : prescription/IM medicine  Data/Risk: radiology tests       Jennifer Nieves PA-C  11/17/20  13:08 EST         EMR Dragon/Transcription disclaimer:  Much of this encounter note is an electronic transcription of spoken language to printed text. Electronic transcription of spoken language may permit erroneous, or at times, nonsensical words or phrases to be inadvertently transcribed. Although I have reviewed the note for such errors, some may still exist.

## 2020-11-13 NOTE — PROGRESS NOTES
Procedure   Large Joint Arthrocentesis: R glenohumeral  Date/Time: 11/13/2020 9:50 AM  Consent given by: patient  Site marked: site marked  Timeout: Immediately prior to procedure a time out was called to verify the correct patient, procedure, equipment, support staff and site/side marked as required   Supporting Documentation  Indications: pain   Procedure Details  Location: shoulder - R glenohumeral  Preparation: Patient was prepped and draped in the usual sterile fashion  Needle size: 22 G  Approach: posterior  Medications administered: 40 mg triamcinolone acetonide 40 MG/ML; 4 mL lidocaine PF 1% 1 %  Patient tolerance: patient tolerated the procedure well with no immediate complications    Large Joint Arthrocentesis: L glenohumeral  Date/Time: 11/13/2020 9:51 AM  Consent given by: patient  Site marked: site marked  Timeout: Immediately prior to procedure a time out was called to verify the correct patient, procedure, equipment, support staff and site/side marked as required   Supporting Documentation  Indications: pain   Procedure Details  Location: shoulder - L glenohumeral  Preparation: Patient was prepped and draped in the usual sterile fashion  Needle size: 22 G  Approach: posterior  Medications administered: 4 mL lidocaine PF 1% 1 %; 40 mg triamcinolone acetonide 40 MG/ML  Patient tolerance: patient tolerated the procedure well with no immediate complications

## 2020-11-17 RX ORDER — TRIAMCINOLONE ACETONIDE 40 MG/ML
40 INJECTION, SUSPENSION INTRA-ARTICULAR; INTRAMUSCULAR
Status: COMPLETED | OUTPATIENT
Start: 2020-11-13 | End: 2020-11-13

## 2020-11-17 RX ORDER — LIDOCAINE HYDROCHLORIDE 10 MG/ML
4 INJECTION, SOLUTION EPIDURAL; INFILTRATION; INTRACAUDAL; PERINEURAL
Status: COMPLETED | OUTPATIENT
Start: 2020-11-13 | End: 2020-11-13

## 2020-11-19 LAB
A VAGINAE DNA VAG QL NAA+PROBE: ABNORMAL SCORE
BVAB2 DNA VAG QL NAA+PROBE: ABNORMAL SCORE
C ALBICANS DNA VAG QL NAA+PROBE: NEGATIVE
C GLABRATA DNA VAG QL NAA+PROBE: NEGATIVE
MEGA1 DNA VAG QL NAA+PROBE: ABNORMAL SCORE

## 2020-12-14 RX ORDER — ACYCLOVIR 400 MG/1
TABLET ORAL
Qty: 90 TABLET | Refills: 11 | Status: SHIPPED | OUTPATIENT
Start: 2020-12-14 | End: 2021-10-13

## 2021-01-08 ENCOUNTER — OFFICE VISIT (OUTPATIENT)
Dept: ORTHOPEDIC SURGERY | Facility: CLINIC | Age: 56
End: 2021-01-08

## 2021-01-08 VITALS — HEART RATE: 84 BPM | BODY MASS INDEX: 27.01 KG/M2 | HEIGHT: 59 IN | OXYGEN SATURATION: 99 % | WEIGHT: 134 LBS

## 2021-01-08 DIAGNOSIS — M19.011 OSTEOARTHRITIS OF BOTH GLENOHUMERAL JOINTS: Primary | ICD-10-CM

## 2021-01-08 DIAGNOSIS — M19.012 OSTEOARTHRITIS OF BOTH GLENOHUMERAL JOINTS: Primary | ICD-10-CM

## 2021-01-08 DIAGNOSIS — R20.2 NUMBNESS AND TINGLING IN BOTH HANDS: ICD-10-CM

## 2021-01-08 DIAGNOSIS — R20.0 NUMBNESS AND TINGLING IN BOTH HANDS: ICD-10-CM

## 2021-01-08 PROCEDURE — 99212 OFFICE O/P EST SF 10 MIN: CPT | Performed by: PHYSICIAN ASSISTANT

## 2021-01-08 NOTE — PROGRESS NOTES
"    [facility] – [place] [facility] Note        Subjective     CC: Follow-up (8 week f/u; Chronic pain of both shoulders (Glenohumeral cortisone injections 11/13/20))      KENYATTA Coon is a 55 y.o. female.  Patient returns for follow-up bilateral shoulders.  She has known glenohumeral osteoarthritis.  Her last visit on 11/13/2020 she was provided bilateral corticosteroid injections to glenohumeral joints.  She reports the injections provide nearly 100% pain relief.  She was also noting bilateral hand numbness and tingling.  Those symptoms resolved with the use of nighttime bracing.    Patient states that without the injection she has a pain scale of 5-7/10 but following the corticosteroid injection she has near 100% pain relief.    Overall, patient's symptoms are improved/symptoms resolved following corticosteroid injection.    ROS:    Constiutional:Pt denies fever, chills, nausea, or vomiting.  MSK:as above        Objective      Past Medical History  Past Medical History:   Diagnosis Date   • Abnormal EKG 2012    Dr Ceballos   • Alpha thalassemia (CMS/HCC)    • Anxiety    • Cyst of thyroid determined by ultrasound 10/2013    2 small colloid cysts, otherwise nml   • H/O mammogram 2017    [facility]   • Hemorrhoid    • Herpes simplex    • History of MRI of lumbar spine 01/2015    deg, post surgical changes   • Hx MRSA infection 06/2014    R leg   • Hx of mammogram 2014    dx'ic - probable benign calcifications, repeat in 6m   • Hypertension    • LBBB (left bundle branch block) 2014   • Microcytic anemia    • Pap smear for cervical cancer screening 2017    Dr. Pugh   • Plantar fasciitis of left foot    • Restless legs syndrome (RLS)    • Routine general medical examination at a health care facility    • Uncomplicated opioid dependence (CMS/HCC)          Physical Exam  Pulse 84   Ht 149.9 cm (59.02\")   Wt 60.8 kg (134 lb)   LMP  (LMP Unknown)   SpO2 99%   BMI 27.05 kg/m²     Body mass index is 27.05 " kg/m².    Patient is well nourished and well developed.        Ortho Exam  Bilateral shoulders  Tenderness: Patient denies any significant pain to the shoulders at this time since having the corticosteroid injections.  She does feel some popping and grinding with range of motion.    Motion: , ER 45, IR L5  Motor/sensory: Grossly intact C5-T1.      Imaging/Labs/EMG Reviewed:  No new imaging today.      Assessment:  1. Osteoarthritis of both glenohumeral joints    2. Numbness and tingling in both hands        Plan:  1. Bilateral glenohumeral osteoarthritis, stable following corticosteroid injections in November 2020.  2. Patient to continue with range of motion and stretching exercises.  3. Bilateral numbness and tingling both hands--resolved with use of nighttime bracing.  4. Patient may continue use of meloxicam as needed.  She can also take Tylenol as needed as well.  5. Patient understands that she can receive these injections 2-3 times a year but needs to keep them at approximately 4 months apart.  We also had a long discussion regarding proceeding with TSA when pain is too much, unresponsive to corticosteroid injections and quality of life is suffering.  Next injections can be given in March 2021 if needed.  6. Follow-up as needed.  7. Questions and concerns answered.      Jennifer Nieves PA-C  01/12/21  11:03 EST      Dragon disclaimer:  Much of this encounter note is an electronic transcription/translation of spoken language to printed text. The electronic translation of spoken language may permit erroneous, or at times, nonsensical words or phrases to be inadvertently transcribed; Although I have reviewed the note for such errors, some may still exist.

## 2021-01-14 RX ORDER — LEVOTHYROXINE SODIUM 0.05 MG/1
TABLET ORAL
Qty: 30 TABLET | Refills: 10 | Status: SHIPPED | OUTPATIENT
Start: 2021-01-14 | End: 2021-10-13 | Stop reason: SDUPTHER

## 2021-06-10 RX ORDER — FLUCONAZOLE 150 MG/1
150 TABLET ORAL ONCE
Qty: 1 TABLET | Refills: 0 | Status: SHIPPED | OUTPATIENT
Start: 2021-06-10 | End: 2021-08-10

## 2021-08-10 RX ORDER — FLUCONAZOLE 150 MG/1
TABLET ORAL
Qty: 1 TABLET | Refills: 0 | Status: SHIPPED | OUTPATIENT
Start: 2021-08-10 | End: 2021-08-12

## 2021-08-12 ENCOUNTER — OFFICE VISIT (OUTPATIENT)
Dept: INTERNAL MEDICINE | Facility: CLINIC | Age: 56
End: 2021-08-12

## 2021-08-12 VITALS
SYSTOLIC BLOOD PRESSURE: 110 MMHG | TEMPERATURE: 98 F | HEART RATE: 76 BPM | WEIGHT: 122.8 LBS | OXYGEN SATURATION: 98 % | BODY MASS INDEX: 24.76 KG/M2 | DIASTOLIC BLOOD PRESSURE: 62 MMHG | HEIGHT: 59 IN

## 2021-08-12 DIAGNOSIS — M54.2 NECK PAIN: ICD-10-CM

## 2021-08-12 DIAGNOSIS — E03.9 HYPOTHYROIDISM (ACQUIRED): Primary | Chronic | ICD-10-CM

## 2021-08-12 DIAGNOSIS — B00.9 HERPES SIMPLEX: Chronic | ICD-10-CM

## 2021-08-12 DIAGNOSIS — R22.0 GINGIVAL SWELLING: ICD-10-CM

## 2021-08-12 DIAGNOSIS — R53.83 OTHER FATIGUE: ICD-10-CM

## 2021-08-12 DIAGNOSIS — R22.1 MASS OF LEFT SIDE OF NECK: ICD-10-CM

## 2021-08-12 DIAGNOSIS — K64.1 GRADE II HEMORRHOIDS: Chronic | ICD-10-CM

## 2021-08-12 DIAGNOSIS — E55.9 VITAMIN D DEFICIENCY: ICD-10-CM

## 2021-08-12 PROCEDURE — 99214 OFFICE O/P EST MOD 30 MIN: CPT | Performed by: INTERNAL MEDICINE

## 2021-08-12 NOTE — PROGRESS NOTES
Chief Complaint  Headache (woke up Monday had a headache went up the back of her head, she has had several things happen to her since Sunday.), Fatigue (she does not feel well, she has felt like she has had a vaginal infection off and on, her gum swelled up over her tooth), and fever blister (she also had a fever blister pop up and her hemrrhoids also flarred, this has happened all this week)    Subjective          Bhavani Coon presents to [facility]  History of Present Illness    Headache/neck pain that occurred 4 days ago and Had to miss work.lasted about a day. She did take some motrin which helped    Hemorrhoids - became inflamed 2 days ago and painful. No bleeding. She is using tucks and prep H  She did have colon done and saw Dr Vazquez in 2019 and they discussed hemorrhoid surgery but patient declined at the time.  They do bother her intermittently.  She tries to keep stools soft and does not strain.    Vaginal irritation and discharge - She did take a diflucan 2 days and vaginal symptoms are better. She didn't notice any vaginal ulcers.  She does have history of genital herpes.    Fever blister-she is on acyclovir bid and will take more when she gets a fever blister or a genital herpes outbreak.  She feels like she is getting ready to get one on her upper lip towards the right  We did a NuSwab in 11/20 which was neg for yeast, and was indeterminant fo BV    Hypothyroid - she is on synthroid 50 and last TSH was in 10/20    Has swelling around her tooth on left side over the last few days.  She sees  dentistry and did have an appointment today but canceled so she could come here first.  She will try to get back in with them.  She is having some dental work done and will need a bridge.    Fatigue-has felt very rundown recently.  Not much appetite.  Weight down 12 pounds from January.  She is on vit C, MVI daily    Current Outpatient Medications:   •  acyclovir  "(ZOVIRAX) 400 MG tablet, TAKE ONE TABLET BY MOUTH THREE TIMES A DAY FOR FIVE DAYS AS NEEDED FOR BREAKOUT, THEN TAKE ONE TABLET BY MOUTH TWICE A DAY BETWEEN BREAKOUTS, Disp: 90 tablet, Rfl: 11  •  buprenorphine-naloxone (SUBOXONE) 8-2 MG per SL tablet, Place 1 tablet under the tongue Daily., Disp: , Rfl:   •  buPROPion XL (WELLBUTRIN XL) 300 MG 24 hr tablet, Take 300 mg by mouth Daily., Disp: , Rfl:   •  cloNIDine (CATAPRES) 0.1 MG tablet, Take 0.1 mg by mouth 2 (Two) Times a Day As Needed for High Blood Pressure., Disp: , Rfl:   •  levothyroxine (SYNTHROID, LEVOTHROID) 50 MCG tablet, TAKE ONE TABLET BY MOUTH DAILY, Disp: 30 tablet, Rfl: 10  •  meloxicam (MOBIC) 15 MG tablet, 1 PO Daily with food. (Patient taking differently: Take 15 mg by mouth Daily As Needed. 1 PO Daily with food.), Disp: 90 tablet, Rfl: 2      Objective   Vital Signs:   /62 (BP Location: Right arm, Patient Position: Sitting)   Pulse 76   Temp 98 °F (36.7 °C) (Infrared)   Ht 149.9 cm (59\")   Wt 55.7 kg (122 lb 12.8 oz)   SpO2 98%   BMI 24.80 kg/m²       Physical exam  Constitutional: oriented to person, place, and time.  well-developed and well-nourished. No distress.   HENT:   Head: Normocephalic and atraumatic.  No fever blisters today  OP: No lesions in throat.  Left lower mouth-swelling in gingiva over tooth  Eyes: Conjunctivae and EOM are normal.   Neck: Approximately 2 cm posterior cervical mass (similar in size to previous)  Cardiovascular: Normal rate, regular rhythm and normal heart sounds.  Exam reveals no gallop and no friction rub.    No murmur heard.  Pulmonary/Chest: Effort normal and breath sounds normal. No respiratory distress.   no wheezes.   Neurological:  alert and oriented to person, place, and time.   Skin: Skin is warm and dry. not diaphoretic.   Psychiatric:  normal mood and affect. behavior is normal. Judgment and thought content normal.    : No herpetic lesions seen in vulva and no vaginal discharge today.  " Large external hemorrhoid that is tender    Result Review :   The following data was reviewed by: Lainey Mcmahon MD on 08/12/2021:  CMP    CMP 10/12/20   Glucose 75   BUN 13   Creatinine 0.77   eGFR Non African Am 78   Sodium 139   Potassium 4.2   Chloride 103   Calcium 9.5   Albumin 4.40   Total Bilirubin 0.2   Alkaline Phosphatase 94   AST (SGOT) 15   ALT (SGPT) 11           CBC    CBC 10/12/20   WBC 8.76   RBC 5.17   Hemoglobin 9.8 (A)   Hematocrit 34.8   MCV 67.3 (A)   MCH 19.0 (A)   MCHC 28.2 (A)   RDW 17.2 (A)   Platelets 437   (A) Abnormal value            TSH    TSH 10/12/20   TSH 0.554           Lab Results   Component Value Date    EBTO72UF 38.7 10/26/2016    [id number] 435 10/26/2016               Assessment and Plan    Diagnoses and all orders for this visit:    1. Hypothyroidism (acquired) (Primary)  Comments:  Patient with more fatigue recently so we will check her levels  Orders:  -     TSH; Future    2. Herpes simplex  Comments:  Would continue acyclovir 400 twice daily for prophylaxis and 400 3 times daily when she has a flare    3. Grade II hemorrhoids  Comments:  We will go ahead and refer her back to .  Discussed sitz baths and keeping stool soft and continuing the over-the-counter's  Orders:  -     Ambulatory Referral to Colorectal Surgery    4. Other fatigue  Comments:  We will check her blood work  Orders:  -     Vitamin B12; Future  -     Vitamin D 25 Hydroxy; Future  -     CBC w MANUAL Differential; Future  -     Comprehensive Metabolic Panel; Future    5. Vitamin D deficiency  Comments:  Check levels  Orders:  -     Vitamin D 25 Hydroxy; Future    6. Mass of left side of neck  Comments:  Has been present for 6 years with minimal increase in size.  Felt to be a probable lipoma when she saw ENT in 2016    7. Neck pain  Comments:  With headache-has resolved    8. Gingival swelling  Comments:  Advised salt water rinses and follow-up with dentist for further  recommendations        Follow Up   No follow-ups on file.  Patient was given instructions and counseling regarding her condition or for health maintenance advice. Please see specific information pulled into the AVS if appropriate.

## 2021-08-16 ENCOUNTER — LAB (OUTPATIENT)
Dept: LAB | Facility: HOSPITAL | Age: 56
End: 2021-08-16

## 2021-08-16 DIAGNOSIS — E55.9 VITAMIN D DEFICIENCY: ICD-10-CM

## 2021-08-16 DIAGNOSIS — E03.9 HYPOTHYROIDISM (ACQUIRED): Chronic | ICD-10-CM

## 2021-08-16 DIAGNOSIS — R53.83 OTHER FATIGUE: ICD-10-CM

## 2021-08-16 PROCEDURE — 85007 BL SMEAR W/DIFF WBC COUNT: CPT | Performed by: INTERNAL MEDICINE

## 2021-08-16 PROCEDURE — 84443 ASSAY THYROID STIM HORMONE: CPT | Performed by: INTERNAL MEDICINE

## 2021-08-16 PROCEDURE — 85027 COMPLETE CBC AUTOMATED: CPT | Performed by: INTERNAL MEDICINE

## 2021-08-16 PROCEDURE — 82306 VITAMIN D 25 HYDROXY: CPT | Performed by: INTERNAL MEDICINE

## 2021-08-16 PROCEDURE — 82607 VITAMIN B-12: CPT | Performed by: INTERNAL MEDICINE

## 2021-08-16 PROCEDURE — 80053 COMPREHEN METABOLIC PANEL: CPT | Performed by: INTERNAL MEDICINE

## 2021-08-17 LAB
25(OH)D3 SERPL-MCNC: 35.2 NG/ML (ref 30–100)
ALBUMIN SERPL-MCNC: 4.4 G/DL (ref 3.5–5.2)
ALBUMIN/GLOB SERPL: 1.8 G/DL
ALP SERPL-CCNC: 57 U/L (ref 39–117)
ALT SERPL W P-5'-P-CCNC: 13 U/L (ref 1–33)
ANION GAP SERPL CALCULATED.3IONS-SCNC: 8 MMOL/L (ref 5–15)
ANISOCYTOSIS BLD QL: ABNORMAL
AST SERPL-CCNC: 16 U/L (ref 1–32)
BILIRUB SERPL-MCNC: <0.2 MG/DL (ref 0–1.2)
BUN SERPL-MCNC: 9 MG/DL (ref 6–20)
BUN/CREAT SERPL: 11.1 (ref 7–25)
CALCIUM SPEC-SCNC: 9.2 MG/DL (ref 8.6–10.5)
CHLORIDE SERPL-SCNC: 103 MMOL/L (ref 98–107)
CO2 SERPL-SCNC: 27 MMOL/L (ref 22–29)
CREAT SERPL-MCNC: 0.81 MG/DL (ref 0.57–1)
DEPRECATED RDW RBC AUTO: 41.9 FL (ref 37–54)
EOSINOPHIL # BLD MANUAL: 0.07 10*3/MM3 (ref 0–0.4)
EOSINOPHIL NFR BLD MANUAL: 1 % (ref 0.3–6.2)
ERYTHROCYTE [DISTWIDTH] IN BLOOD BY AUTOMATED COUNT: 17.1 % (ref 12.3–15.4)
GFR SERPL CREATININE-BSD FRML MDRD: 73 ML/MIN/1.73
GLOBULIN UR ELPH-MCNC: 2.4 GM/DL
GLUCOSE SERPL-MCNC: 81 MG/DL (ref 65–99)
HCT VFR BLD AUTO: 34.6 % (ref 34–46.6)
HGB BLD-MCNC: 10.3 G/DL (ref 12–15.9)
LYMPHOCYTES # BLD MANUAL: 3.23 10*3/MM3 (ref 0.7–3.1)
LYMPHOCYTES NFR BLD MANUAL: 48.5 % (ref 19.6–45.3)
LYMPHOCYTES NFR BLD MANUAL: 5.1 % (ref 5–12)
MCH RBC QN AUTO: 20.7 PG (ref 26.6–33)
MCHC RBC AUTO-ENTMCNC: 29.8 G/DL (ref 31.5–35.7)
MCV RBC AUTO: 69.5 FL (ref 79–97)
MICROCYTES BLD QL: ABNORMAL
MONOCYTES # BLD AUTO: 0.34 10*3/MM3 (ref 0.1–0.9)
NEUTROPHILS # BLD AUTO: 3.03 10*3/MM3 (ref 1.7–7)
NEUTROPHILS NFR BLD MANUAL: 45.5 % (ref 42.7–76)
PLAT MORPH BLD: NORMAL
PLATELET # BLD AUTO: 361 10*3/MM3 (ref 140–450)
PMV BLD AUTO: 10 FL (ref 6–12)
POIKILOCYTOSIS BLD QL SMEAR: ABNORMAL
POLYCHROMASIA BLD QL SMEAR: ABNORMAL
POTASSIUM SERPL-SCNC: 3.8 MMOL/L (ref 3.5–5.2)
PROT SERPL-MCNC: 6.8 G/DL (ref 6–8.5)
RBC # BLD AUTO: 4.98 10*6/MM3 (ref 3.77–5.28)
SODIUM SERPL-SCNC: 138 MMOL/L (ref 136–145)
TSH SERPL DL<=0.05 MIU/L-ACNC: 2.84 UIU/ML (ref 0.27–4.2)
VIT B12 BLD-MCNC: 485 PG/ML (ref 211–946)
WBC # BLD AUTO: 6.66 10*3/MM3 (ref 3.4–10.8)
WBC MORPH BLD: NORMAL

## 2021-09-20 RX ORDER — FLUCONAZOLE 150 MG/1
150 TABLET ORAL ONCE
Qty: 1 TABLET | Refills: 0 | Status: SHIPPED | OUTPATIENT
Start: 2021-09-20 | End: 2021-09-20

## 2021-10-13 ENCOUNTER — OFFICE VISIT (OUTPATIENT)
Dept: INTERNAL MEDICINE | Facility: CLINIC | Age: 56
End: 2021-10-13

## 2021-10-13 VITALS
HEART RATE: 75 BPM | SYSTOLIC BLOOD PRESSURE: 104 MMHG | WEIGHT: 119.4 LBS | BODY MASS INDEX: 24.07 KG/M2 | HEIGHT: 59 IN | TEMPERATURE: 97.8 F | OXYGEN SATURATION: 100 % | RESPIRATION RATE: 18 BRPM | DIASTOLIC BLOOD PRESSURE: 68 MMHG

## 2021-10-13 DIAGNOSIS — B00.9 HERPES SIMPLEX: ICD-10-CM

## 2021-10-13 DIAGNOSIS — Z23 INFLUENZA VACCINE ADMINISTERED: ICD-10-CM

## 2021-10-13 DIAGNOSIS — I10 BENIGN ESSENTIAL HYPERTENSION: ICD-10-CM

## 2021-10-13 DIAGNOSIS — Z00.00 ANNUAL PHYSICAL EXAM: Primary | ICD-10-CM

## 2021-10-13 DIAGNOSIS — E03.9 HYPOTHYROIDISM (ACQUIRED): ICD-10-CM

## 2021-10-13 PROCEDURE — 90471 IMMUNIZATION ADMIN: CPT | Performed by: NURSE PRACTITIONER

## 2021-10-13 PROCEDURE — 90686 IIV4 VACC NO PRSV 0.5 ML IM: CPT | Performed by: NURSE PRACTITIONER

## 2021-10-13 PROCEDURE — 99396 PREV VISIT EST AGE 40-64: CPT | Performed by: NURSE PRACTITIONER

## 2021-10-13 RX ORDER — LEVOTHYROXINE SODIUM 0.05 MG/1
50 TABLET ORAL DAILY
Qty: 30 TABLET | Refills: 11 | Status: SHIPPED | OUTPATIENT
Start: 2021-10-13 | End: 2022-10-13 | Stop reason: SDUPTHER

## 2021-10-13 RX ORDER — DOXEPIN HYDROCHLORIDE 10 MG/1
10 CAPSULE ORAL NIGHTLY
COMMUNITY
End: 2022-08-19

## 2021-10-13 RX ORDER — ACYCLOVIR 400 MG/1
400 TABLET ORAL 2 TIMES DAILY
Qty: 60 TABLET | Refills: 6 | Status: SHIPPED | OUTPATIENT
Start: 2021-10-13 | End: 2021-11-15

## 2021-10-13 NOTE — PROGRESS NOTES
Patient Care Team:  Lainey Mcmahon MD as PCP - General (Internal Medicine)  Lainey Mcmahon MD as PCP - Family Medicine  Ese Larson MD (Pain Medicine)     Chief complaint: Patient is in today for a physical          Patient is a 56 y.o. female who presents for her yearly physical exam.     HPI     Fever blisters- recurrent.  Wants supressive therapy.     hypothyroid- stable, last tsh was NL in 8/21,  On levothyroxine daily. Has trouble losing weight.    Health maintenance/lifestyle:  Health Maintenance   Topic Date Due   • ZOSTER VACCINE (1 of 2) Never done   • PAP SMEAR  06/10/2016   • ANNUAL PHYSICAL  10/13/2021   • MAMMOGRAM  10/22/2021   • TDAP/TD VACCINES (2 - Td or Tdap) 07/01/2022   • COLORECTAL CANCER SCREENING  04/15/2029   • HEPATITIS C SCREENING  Completed   • COVID-19 Vaccine  Completed   • INFLUENZA VACCINE  Completed   • Pneumococcal Vaccine 0-64  Aged Out       Immunization History   Administered Date(s) Administered   • COVID-19 (PFIZER) 03/18/2021, 04/20/2021   • Flu Vaccine Quad PF >36MO 08/24/2017   • FluLaval/Fluarix/Fluzone >6 09/18/2019, 10/12/2020, 10/13/2021   • Hepatitis A 05/14/2018, 11/07/2018   • Influenza Quad Vaccine (Inpatient) 11/23/2016   • Influenza, Unspecified 12/01/2014, 08/24/2017, 11/07/2018   • Tdap 07/01/2012       Covid vaccine: Completed  Influenza: Due, will update  Tetanus: 2012, due 2022  Hep C screening:   neg 2020   Pap: 2012, hx of hysterectomy         Cancer-related family history includes Breast cancer (age of onset: 57) in her maternal grandmother; Liver cancer in her father; Lymphoma in her mother; Ovarian cancer (age of onset: 47) in her paternal aunt.        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    Self breast exam: does regularly, denies concerning area.   Colonoscopy:4/2019, Dr. Vazquez, repeat at 11 y     Eye exam: UTD, wears glasses.   Dental exam: UTD  Sleep: sleeps ok.      Sunscreen  use: wears   Seatbelt use: wears      Diet: eats pretty healthy, eats out rarely, healthy snacks,   Caffeine- 1-2 per day, drinks lots of water.   Exercise: walks dogs 1-2 times everyday      Social History     Tobacco Use   Smoking Status Never Smoker   Smokeless Tobacco Never Used     Social History     Substance and Sexual Activity   Alcohol Use Not Currently       PHQ-2 Depression Screening  Little interest or pleasure in doing things? 0   Feeling down, depressed, or hopeless? 0   PHQ-2 Total Score 0           Review of Systems   Constitutional: Negative for activity change, appetite change, chills, diaphoresis, fatigue, fever, unexpected weight gain and unexpected weight loss.   HENT: Negative for voice change.    Eyes: Negative for blurred vision, double vision, pain and visual disturbance.   Respiratory: Negative for cough, chest tightness, shortness of breath and wheezing.    Cardiovascular: Negative for chest pain, palpitations and leg swelling.   Gastrointestinal: Negative for abdominal distention, abdominal pain, constipation, diarrhea, nausea and vomiting.   Endocrine: Negative for cold intolerance, heat intolerance, polydipsia, polyphagia and polyuria.   Genitourinary: Negative for difficulty urinating, frequency and urgency.        Recurrent herpes simplex lesions     Musculoskeletal: Negative for arthralgias and myalgias.   Skin: Negative for color change, dry skin and rash.   Neurological: Negative for dizziness, facial asymmetry, weakness, light-headedness, numbness, headache and confusion.   Hematological: Negative for adenopathy. Does not bruise/bleed easily.   Psychiatric/Behavioral: Negative for decreased concentration and sleep disturbance. The patient is nervous/anxious.        History  Social History     Socioeconomic History   • Marital status:    Tobacco Use   • Smoking status: Never Smoker   • Smokeless tobacco: Never Used   Vaping Use   • Vaping Use: Never used   Substance and  Sexual Activity   • Alcohol use: Not Currently   • Drug use: No   • Sexual activity: Not Currently     Past Medical History:   Diagnosis Date   • Abnormal EKG 2012    Dr Ceballos   • Alpha thalassemia (HCC)    • Anxiety    • Cyst of thyroid determined by ultrasound 10/2013    2 small colloid cysts, otherwise nml   • H/O mammogram 2017    [facility]   • Hemorrhoid    • Herpes simplex    • History of MRI of lumbar spine 01/2015    deg, post surgical changes   • Hx MRSA infection 06/2014    R leg   • Hx of mammogram 2014    dx'ic - probable benign calcifications, repeat in 6m   • Hypertension    • LBBB (left bundle branch block) 2014   • Microcytic anemia    • Neck mass 2015    left -saw ENT in 2016-felt to be lipoma   • Pap smear for cervical cancer screening 2017    Dr. Pugh   • Plantar fasciitis of left foot    • Restless legs syndrome (RLS)    • Routine general medical examination at a health care facility    • Uncomplicated opioid dependence (HCC)       Past Surgical History:   Procedure Laterality Date   • BREAST BIOPSY Left 2017    stereo bx left breast.    • CARDIAC CATHETERIZATION  2012    negative. Dr. Ceballos   • EYE MUSCLE SURGERY Left 01/2018   • KNEE SURGERY Right 2003    torn cartilage   • LAMINECTOMY  07/2009    L3 (Dr. Negro)   • LEEP  1995    cervical dysplasia   • LUMBAR DISCECTOMY  07/2009    Dr. Negro. L3-L4   • OOPHORECTOMY     • SUPRACERVICAL HYSTERECTOMY SALPINGO OOPHORECTOMY  2009    L SO      Allergies   Allergen Reactions   • Vitamin B12 Rash     Rash and nausea and vomiting      Family History   Problem Relation Age of Onset   • Hypertension Mother    • Anemia Mother    • Lymphoma Mother    • Hyperlipidemia Mother    • Liver cancer Father    • Diabetes Father    • Breast cancer Maternal Grandmother 57   • Stroke Maternal Grandmother    • Ovarian cancer Paternal Aunt 47       Current Outpatient Medications:   •  buprenorphine-naloxone (SUBOXONE) 8-2 MG per SL tablet, Place 1 tablet under the  "tongue Daily., Disp: , Rfl:   •  buPROPion XL (WELLBUTRIN XL) 300 MG 24 hr tablet, Take 300 mg by mouth Daily., Disp: , Rfl:   •  cloNIDine (CATAPRES) 0.1 MG tablet, Take 0.1 mg by mouth 2 (Two) Times a Day As Needed for High Blood Pressure., Disp: , Rfl:   •  doxepin (SINEquan) 10 MG capsule, Take 10 mg by mouth Every Night., Disp: , Rfl:   •  levothyroxine (SYNTHROID, LEVOTHROID) 50 MCG tablet, Take 1 tablet by mouth Daily., Disp: 30 tablet, Rfl: 11  •  meloxicam (MOBIC) 15 MG tablet, 1 PO Daily with food. (Patient taking differently: Take 15 mg by mouth Daily As Needed. 1 PO Daily with food.), Disp: 90 tablet, Rfl: 2  •  acyclovir (Zovirax) 400 MG tablet, Take 1 tablet by mouth 2 (Two) Times a Day., Disp: 60 tablet, Rfl: 6              /68   Pulse 75   Temp 97.8 °F (36.6 °C) (Infrared)   Resp 18   Ht 150 cm (59.06\")   Wt 54.2 kg (119 lb 6.4 oz)   LMP  (LMP Unknown)   SpO2 100%   BMI 24.07 kg/m²       Physical Exam  Vitals and nursing note reviewed.   Constitutional:       General: She is not in acute distress.     Appearance: Normal appearance. She is well-developed. She is not diaphoretic.   HENT:      Head: Normocephalic and atraumatic.      Right Ear: External ear normal.      Left Ear: External ear normal.      Nose: Nose normal.   Eyes:      General: No scleral icterus.        Right eye: No discharge.         Left eye: No discharge.      Conjunctiva/sclera: Conjunctivae normal.      Pupils: Pupils are equal, round, and reactive to light.   Neck:      Thyroid: No thyromegaly.      Vascular: No carotid bruit or JVD.      Trachea: No tracheal deviation.   Cardiovascular:      Rate and Rhythm: Normal rate and regular rhythm.      Heart sounds: Normal heart sounds. No murmur heard.  No friction rub. No gallop.    Pulmonary:      Effort: Pulmonary effort is normal. No respiratory distress.      Breath sounds: Normal breath sounds. No wheezing or rales.   Chest:      Chest wall: No tenderness. "   Abdominal:      General: Bowel sounds are normal. There is no distension.      Palpations: Abdomen is soft. There is no mass.      Tenderness: There is no abdominal tenderness.      Hernia: No hernia is present.   Musculoskeletal:         General: No tenderness or deformity. Normal range of motion.      Cervical back: Normal range of motion and neck supple.   Lymphadenopathy:      Head:      Right side of head: No submental, submandibular, tonsillar, preauricular, posterior auricular or occipital adenopathy.      Left side of head: No submental, submandibular, tonsillar, preauricular, posterior auricular or occipital adenopathy.      Cervical: No cervical adenopathy.   Skin:     General: Skin is warm and dry.      Coloration: Skin is not pale.      Findings: No erythema or rash.   Neurological:      Mental Status: She is alert and oriented to person, place, and time.      Deep Tendon Reflexes: Reflexes are normal and symmetric. Reflexes normal.   Psychiatric:         Behavior: Behavior normal.         Thought Content: Thought content normal.         Judgment: Judgment normal.           Lab Results   Component Value Date    GLUCOSE 81 08/16/2021    BUN 9 08/16/2021    CREATININE 0.81 08/16/2021    EGFRIFNONA 73 08/16/2021    BCR 11.1 08/16/2021    K 3.8 08/16/2021    CO2 27.0 08/16/2021    CALCIUM 9.2 08/16/2021    ALBUMIN 4.40 08/16/2021    AST 16 08/16/2021    ALT 13 08/16/2021     Lab Results   Component Value Date    WBC 6.66 08/16/2021    HGB 10.3 (L) 08/16/2021    HCT 34.6 08/16/2021    MCV 69.5 (L) 08/16/2021     08/16/2021     Lab Results   Component Value Date    CHOL 150 10/12/2020    TRIG 160 (H) 10/12/2020    HDL 40 10/12/2020    LDL 82 10/12/2020     Lab Results   Component Value Date    TSH 2.840 08/16/2021               Diagnoses and all orders for this visit:    1. Annual physical exam (Primary)    2. Benign essential hypertension  Well-controlled  3. Hypothyroidism (acquired)  -      levothyroxine (SYNTHROID, LEVOTHROID) 50 MCG tablet; Take 1 tablet by mouth Daily.  Dispense: 30 tablet; Refill: 11  Clinically euthyroid.  Continue levothyroxine.  4. Influenza vaccine administered  -     FluLaval/Fluarix/Fluzone >6 Months ([id number])    5. Herpes simplex  -     acyclovir (Zovirax) 400 MG tablet; Take 1 tablet by mouth 2 (Two) Times a Day.  Dispense: 60 tablet; Refill: 6  Start suppressive therapy with acyclovir as directed.  Should reevaluate continued need for suppressive therapy in 6 months or so      Immunizations and screenings:  preventative/screenings are up-to-date/addressed as noted in the HPI.  Counseling: The patient is advised to continue current medications, continue current healthy lifestyle patterns and return for routine annual checkups  Follow up: Return in about 1 year (around 10/13/2022) for Annual.  Plan of care discussed with pt. They verbalized understanding and agreement.     Bharti Loco, APRN     * Please note that portions of this note were completed with dragon voice recognition program.

## 2021-10-26 ENCOUNTER — TRANSCRIBE ORDERS (OUTPATIENT)
Dept: ADMINISTRATIVE | Facility: HOSPITAL | Age: 56
End: 2021-10-26

## 2021-10-26 DIAGNOSIS — Z12.31 ENCOUNTER FOR SCREENING MAMMOGRAM FOR MALIGNANT NEOPLASM OF BREAST: Primary | ICD-10-CM

## 2021-11-05 ENCOUNTER — APPOINTMENT (OUTPATIENT)
Dept: MAMMOGRAPHY | Facility: HOSPITAL | Age: 56
End: 2021-11-05

## 2021-11-13 DIAGNOSIS — B00.9 HERPES SIMPLEX: ICD-10-CM

## 2021-11-15 RX ORDER — ACYCLOVIR 400 MG/1
TABLET ORAL
Qty: 90 TABLET | Refills: 0 | Status: SHIPPED | OUTPATIENT
Start: 2021-11-15 | End: 2022-01-13 | Stop reason: SDUPTHER

## 2021-11-15 NOTE — TELEPHONE ENCOUNTER
Rx Refill Note  Requested Prescriptions     Pending Prescriptions Disp Refills   • acyclovir (ZOVIRAX) 400 MG tablet [Pharmacy Med Name: ACYCLOVIR 400 MG TABLET] 90 tablet 0     Sig: TAKE ONE TABLET BY MOUTH THREE TIMES A DAY FOR 5 DAYS AS NEEDED FOR BREAKOUTS, THEN TAKE ONE TABLET BY MOUTH TWICE A DAY BETWEEN BREAKOUTS      Last office visit with prescribing clinician: 8/12/2021      Next office visit with prescribing clinician: 10/13/2022            Tania Granados MA  11/15/21, 11:40 EST

## 2022-01-13 DIAGNOSIS — B00.9 HERPES SIMPLEX: ICD-10-CM

## 2022-01-13 RX ORDER — ACYCLOVIR 400 MG/1
TABLET ORAL
Qty: 90 TABLET | Refills: 4 | Status: SHIPPED | OUTPATIENT
Start: 2022-01-13 | End: 2022-06-27

## 2022-01-18 RX ORDER — FLUCONAZOLE 150 MG/1
150 TABLET ORAL ONCE
Qty: 1 TABLET | Refills: 0 | Status: SHIPPED | OUTPATIENT
Start: 2022-01-18 | End: 2022-01-18

## 2022-04-22 RX ORDER — FLUCONAZOLE 150 MG/1
150 TABLET ORAL ONCE
Qty: 1 TABLET | Refills: 0 | Status: SHIPPED | OUTPATIENT
Start: 2022-04-22 | End: 2022-04-22

## 2022-06-26 DIAGNOSIS — B00.9 HERPES SIMPLEX: ICD-10-CM

## 2022-06-27 RX ORDER — ACYCLOVIR 400 MG/1
TABLET ORAL
Qty: 90 TABLET | Refills: 0 | Status: SHIPPED | OUTPATIENT
Start: 2022-06-27 | End: 2022-08-08

## 2022-06-27 NOTE — TELEPHONE ENCOUNTER
Rx Refill Note  Requested Prescriptions     Pending Prescriptions Disp Refills   • acyclovir (ZOVIRAX) 400 MG tablet [Pharmacy Med Name: ACYCLOVIR 400 MG TABLET] 90 tablet 4     Sig: TAKE ONE TABLET BY MOUTH THREE TIMES A DAY FOR 5 DAYS AS NEEDED FOR BREAKOUTS THEN TAKE ONE TABLET BY MOUTH TWICE A DAY BETWEEN BREAKOUTS      Last filled:  Last office visit with prescribing clinician: 8/12/2021      Next office visit with prescribing clinician: 10/13/2022     April MONY Martin MA  06/27/22, 10:13 EDT

## 2022-07-25 ENCOUNTER — PATIENT MESSAGE (OUTPATIENT)
Dept: INTERNAL MEDICINE | Facility: CLINIC | Age: 57
End: 2022-07-25

## 2022-07-25 ENCOUNTER — TELEPHONE (OUTPATIENT)
Dept: INTERNAL MEDICINE | Facility: CLINIC | Age: 57
End: 2022-07-25

## 2022-07-25 NOTE — TELEPHONE ENCOUNTER
PATIENT DID NOT WANT TO GO TO THE EMERGENCY ROOM OR SPEAK TO ANYONE WHEN I OFFERED BUT SHE HAS BEEN HAVING SHARP PAINS IN THE CHEST OFF AND ON FOR THE PAST FEW DAYS.  I THOUGHT THAT THIS IS SOMETHING THAT CLINICAL  NEEDED TO BE AWARE OF.  I HAVE PUT HER ON THE SCHEDULE TODAY AT HER REQUEST.

## 2022-07-25 NOTE — TELEPHONE ENCOUNTER
I spoke with patient and let her know she should be seen at the ER for her chest pain, jaw pain and left arm feeling weird at times.  She refused to go to the ER and will see how her symptoms develop.  She will speak to her  and decided if she will go to ER.  I advised the patient that Dr. Armijo advised going to ER and if she comes to the appointment will send patient to ER.  She continued to say she will think about going to the ER.

## 2022-07-26 NOTE — TELEPHONE ENCOUNTER
I do think with CP, L arm pain, and jaw pain she should go to ER and make sure it is not her heart- they can check the labs and get them back quickly

## 2022-07-26 NOTE — TELEPHONE ENCOUNTER
I sent patient a my chart message letting her know Dr. Mcmahon agrees she should be seen at the ER and get her symptoms checked out.

## 2022-07-26 NOTE — TELEPHONE ENCOUNTER
Patient returned call and states she has not gone to the ER. Patient is not sure if its anxiety related or not and does not want to go to the ER and have a big bill just to be told she has anxiety. Advised patient if she is still having the chest pain that she really needs to go to the ER. Patient stated again she does not want to go. Patient states her  is out of town and she is alone and doesn't know what to do. Advised patient again to go to the ER and she refused and stated the pain is staying the same. Advised patient one last time that if things continue or get worse to please go to the ER. Patient promised if it got worse she would go

## 2022-08-03 ENCOUNTER — TELEPHONE (OUTPATIENT)
Dept: INTERNAL MEDICINE | Facility: CLINIC | Age: 57
End: 2022-08-03

## 2022-08-07 DIAGNOSIS — B00.9 HERPES SIMPLEX: ICD-10-CM

## 2022-08-08 RX ORDER — ACYCLOVIR 400 MG/1
TABLET ORAL
Qty: 90 TABLET | Refills: 11 | Status: SHIPPED | OUTPATIENT
Start: 2022-08-08

## 2022-08-19 ENCOUNTER — OFFICE VISIT (OUTPATIENT)
Dept: INTERNAL MEDICINE | Facility: CLINIC | Age: 57
End: 2022-08-19

## 2022-08-19 ENCOUNTER — LAB (OUTPATIENT)
Dept: LAB | Facility: HOSPITAL | Age: 57
End: 2022-08-19

## 2022-08-19 VITALS
RESPIRATION RATE: 20 BRPM | BODY MASS INDEX: 24.12 KG/M2 | HEART RATE: 80 BPM | SYSTOLIC BLOOD PRESSURE: 124 MMHG | HEIGHT: 59 IN | TEMPERATURE: 97.1 F | WEIGHT: 119.63 LBS | DIASTOLIC BLOOD PRESSURE: 82 MMHG | OXYGEN SATURATION: 98 %

## 2022-08-19 DIAGNOSIS — I10 HYPERTENSION, UNSPECIFIED TYPE: ICD-10-CM

## 2022-08-19 DIAGNOSIS — R07.9 CHEST PAIN, UNSPECIFIED TYPE: ICD-10-CM

## 2022-08-19 DIAGNOSIS — Z13.220 SCREENING, LIPID: ICD-10-CM

## 2022-08-19 DIAGNOSIS — R94.31 ABNORMAL EKG: ICD-10-CM

## 2022-08-19 DIAGNOSIS — I10 HYPERTENSION, UNSPECIFIED TYPE: Primary | ICD-10-CM

## 2022-08-19 DIAGNOSIS — E03.9 HYPOTHYROIDISM (ACQUIRED): ICD-10-CM

## 2022-08-19 DIAGNOSIS — F41.9 ANXIETY: ICD-10-CM

## 2022-08-19 PROBLEM — R63.5 WEIGHT GAIN: Status: ACTIVE | Noted: 2018-03-06

## 2022-08-19 PROBLEM — R53.83 TIREDNESS: Status: ACTIVE | Noted: 2022-08-19

## 2022-08-19 LAB
BASOPHILS # BLD MANUAL: 0.07 10*3/MM3 (ref 0–0.2)
BASOPHILS NFR BLD MANUAL: 1 % (ref 0–1.5)
DEPRECATED RDW RBC AUTO: 41.8 FL (ref 37–54)
EOSINOPHIL # BLD MANUAL: 0.07 10*3/MM3 (ref 0–0.4)
EOSINOPHIL NFR BLD MANUAL: 1 % (ref 0.3–6.2)
ERYTHROCYTE [DISTWIDTH] IN BLOOD BY AUTOMATED COUNT: 16.2 % (ref 12.3–15.4)
HCT VFR BLD AUTO: 37.5 % (ref 34–46.6)
HGB BLD-MCNC: 11.1 G/DL (ref 12–15.9)
LYMPHOCYTES # BLD MANUAL: 3.96 10*3/MM3 (ref 0.7–3.1)
LYMPHOCYTES NFR BLD MANUAL: 2 % (ref 5–12)
MCH RBC QN AUTO: 21.6 PG (ref 26.6–33)
MCHC RBC AUTO-ENTMCNC: 29.6 G/DL (ref 31.5–35.7)
MCV RBC AUTO: 73 FL (ref 79–97)
MONOCYTES # BLD: 0.15 10*3/MM3 (ref 0.1–0.9)
NEUTROPHILS # BLD AUTO: 3.14 10*3/MM3 (ref 1.7–7)
NEUTROPHILS NFR BLD MANUAL: 42.4 % (ref 42.7–76)
NRBC BLD AUTO-RTO: 0 /100 WBC (ref 0–0.2)
PLAT MORPH BLD: NORMAL
PLATELET # BLD AUTO: 329 10*3/MM3 (ref 140–450)
PMV BLD AUTO: 9.6 FL (ref 6–12)
POIKILOCYTOSIS BLD QL SMEAR: ABNORMAL
RBC # BLD AUTO: 5.14 10*6/MM3 (ref 3.77–5.28)
VARIANT LYMPHS NFR BLD MANUAL: 53.5 % (ref 19.6–45.3)
WBC MORPH BLD: NORMAL
WBC NRBC COR # BLD: 7.41 10*3/MM3 (ref 3.4–10.8)

## 2022-08-19 PROCEDURE — 80050 GENERAL HEALTH PANEL: CPT | Performed by: PHYSICIAN ASSISTANT

## 2022-08-19 PROCEDURE — 84484 ASSAY OF TROPONIN QUANT: CPT | Performed by: PHYSICIAN ASSISTANT

## 2022-08-19 PROCEDURE — 80061 LIPID PANEL: CPT | Performed by: PHYSICIAN ASSISTANT

## 2022-08-19 PROCEDURE — 99214 OFFICE O/P EST MOD 30 MIN: CPT | Performed by: PHYSICIAN ASSISTANT

## 2022-08-19 PROCEDURE — 85007 BL SMEAR W/DIFF WBC COUNT: CPT | Performed by: PHYSICIAN ASSISTANT

## 2022-08-19 PROCEDURE — 93000 ELECTROCARDIOGRAM COMPLETE: CPT | Performed by: PHYSICIAN ASSISTANT

## 2022-08-19 RX ORDER — LISINOPRIL 10 MG/1
10 TABLET ORAL DAILY
Qty: 30 TABLET | Refills: 2 | Status: SHIPPED | OUTPATIENT
Start: 2022-08-19 | End: 2022-10-13 | Stop reason: SDUPTHER

## 2022-08-19 NOTE — PROGRESS NOTES
Chief Complaint  Hypertension    Subjective     {CC  Problem List  Visit Diagnosis   Encounters  Notes  Medications  Labs  Result Review Imaging  Media :23}     History of Present Illness  Bhavani Coon presents to [facility] for   HTN:  Has been having elevated BP in the evenings and headaches for the last month or two. Has occ episodes of sweating/hot flash (no diaphoresis or dizziness), nausea, and chest pains that sometimes occur together but not always. She has been told to go to the ER but has not wanted to due to the cost and the sx go away.  The chest pains last a second or up to a minute sometimes. Sx are random and do not occur with activity.   She has been having increased stress at work lately and thinks some of it is related to stress. She used to be on lisinopril 20mg but her BPs had been ok so they stopped it a few years ago.   BPs at home have been as high as 174/116 and as low as 126/80. She is using a blood pressure cuff that is an arm cuff. Does not drink or smoke, has normal cholesterol.   She has not had tachycardia. No hx of heart problems in the past, never had stress test.     Anxiety:  Is followed by psych Aditya Polanco and she has clonidine that she takes 1-2 times a day for anxiety, she has not taken this for blood pressure before. Does not want us to add any anx med at this time. She will f/u with psych.       Review of Systems   Constitutional: Negative for diaphoresis, fever and unexpected weight loss.   Respiratory: Negative for cough, shortness of breath and wheezing.    Cardiovascular: Positive for chest pain. Negative for palpitations and leg swelling.   Gastrointestinal: Positive for nausea. Negative for abdominal pain, constipation and vomiting.   Neurological: Positive for headache.   Psychiatric/Behavioral: Positive for stress. The patient is nervous/anxious.        The following portions of the patient's history were reviewed and updated  "as appropriate: allergies, current medications, past family history, past medical history, past social history, past surgical history and problem list.  Allergies   Allergen Reactions   • Vitamin B12 Rash     Rash and nausea and vomiting     Current Outpatient Medications on File Prior to Visit   Medication Sig Dispense Refill   • acyclovir (ZOVIRAX) 400 MG tablet TAKE ONE TABLET BY MOUTH THREE TIMES A DAY FOR 5 DAYS AS NEEDED FOR BREAKOUTS, THEN TAKE 1 TABLET BY MOUTH TWO TIMES A DAY BETWEEN BREAKOUTS 90 tablet 11   • buprenorphine-naloxone (SUBOXONE) 8-2 MG per SL tablet Place 1 tablet under the tongue Daily.     • buPROPion XL (WELLBUTRIN XL) 300 MG 24 hr tablet Take 300 mg by mouth Daily.     • cloNIDine (CATAPRES) 0.1 MG tablet Take 0.1 mg by mouth 2 (Two) Times a Day As Needed for High Blood Pressure.     • levothyroxine (SYNTHROID, LEVOTHROID) 50 MCG tablet Take 1 tablet by mouth Daily. 30 tablet 11   • meloxicam (MOBIC) 15 MG tablet 1 PO Daily with food. (Patient taking differently: Take 15 mg by mouth Daily As Needed. 1 PO Daily with food.) 90 tablet 2   • [DISCONTINUED] doxepin (SINEquan) 10 MG capsule Take 10 mg by mouth Every Night.       No current facility-administered medications on file prior to visit.     New Medications Ordered This Visit   Medications   • lisinopril (PRINIVIL,ZESTRIL) 10 MG tablet     Sig: Take 1 tablet by mouth Daily.     Dispense:  30 tablet     Refill:  2     Social History     Tobacco Use   Smoking Status Never Smoker   Smokeless Tobacco Never Used        Objective   Vital Signs:   Vitals:    08/19/22 0920   BP: 124/82   Pulse: 80   Resp: 20   Temp: 97.1 °F (36.2 °C)   TempSrc: Temporal   SpO2: 98%   Weight: 54.3 kg (119 lb 10.1 oz)   Height: 150 cm (59.06\")   PainSc: 0-No pain      Physical Exam  Vitals reviewed.   Constitutional:       General: She is not in acute distress.     Appearance: Normal appearance.   HENT:      Head: Normocephalic and atraumatic.   Eyes:      " General: No scleral icterus.     Extraocular Movements: Extraocular movements intact.      Conjunctiva/sclera: Conjunctivae normal.   Cardiovascular:      Rate and Rhythm: Normal rate and regular rhythm.      Heart sounds: Normal heart sounds. No murmur heard.  Pulmonary:      Effort: Pulmonary effort is normal. No respiratory distress.      Breath sounds: Normal breath sounds. No stridor. No wheezing or rhonchi.   Musculoskeletal:      Cervical back: Normal range of motion and neck supple.   Skin:     General: Skin is warm and dry.      Coloration: Skin is not jaundiced.   Neurological:      General: No focal deficit present.      Mental Status: She is alert and oriented to person, place, and time.      Gait: Gait normal.   Psychiatric:         Mood and Affect: Mood normal.         Behavior: Behavior normal.        No LMP recorded (lmp unknown). Patient has had a hysterectomy.    Result Review :            ECG 12 Lead    Date/Time: 8/19/2022 11:12 AM  Performed by: Erin Rodas PA-C  Authorized by: Erin Rodas PA-C   Comparison: compared with previous ECG from 3/29/2017  Comparison to previous ECG: New onset lft bundle branch block  Rhythm: sinus rhythm  Rate: normal  BPM: 74  Conduction: left bundle branch block  Other findings: non-specific ST-T wave changes    Clinical impression: abnormal EKG  Comments: Left bundle branch block                Assessment and Plan    Diagnoses and all orders for this visit:    1. Hypertension, unspecified type (Primary)  Assessment & Plan:  Hypertension is worsening.  Medication changes per orders.  Blood pressure will be reassessed at the next regular appointmentd/t intermittent elevated Bps at home will add back low dose of lisinopril.    Orders:  -     ECG 12 Lead  -     lisinopril (PRINIVIL,ZESTRIL) 10 MG tablet; Take 1 tablet by mouth Daily.  Dispense: 30 tablet; Refill: 2  -     Ambulatory Referral [facility]  -     Comprehensive Metabolic Panel; Future  -      CBC Auto Differential; Future  -     TSH Rfx On Abnormal To Free T4; Future    2. Chest pain, unspecified type  Assessment & Plan:  EKG w/LBBB, refer to CP clinic, ER if has CP that is persisting, order troponin.     Orders:  -     ECG 12 Lead  -     Ambulatory Referral [facility]  -     Troponin; Future    3. Anxiety  Assessment & Plan:  Chronic, uncontrolled. F/u with psych as dir      4. Hypothyroidism (acquired)  Assessment & Plan:  Check TSH, cont synthroid    Orders:  -     TSH Rfx On Abnormal To Free T4; Future    5. Screening, lipid  -     Lipid Panel; Future    6. Abnormal EKG  -     Ambulatory Referral [facility]      Follow Up   Return for Next scheduled follow up.    Follow up if symptoms worsen or persist or has new or concerning symptoms, go to ER for severe symptoms.   Reviewed common medication effects and side effects and advised to report side effects immediately.  Encouraged medication compliance and the importance of keeping scheduled follow up appointments with me and any other providers.  If a referral was made please contact our office if you have not heard about an appointment in the next 2 weeks.   If labs or images are ordered we will contact you with the results within the next week.  If you have not heard from us after a week please call our office to inquire about the results.   Patient was given instructions and counseling regarding her condition or for health maintenance advice. Please see specific information pulled into the AVS if appropriate.     Erin Rodas PA-C    * Please note that portions of this note were completed with a voice recognition program.

## 2022-08-19 NOTE — ASSESSMENT & PLAN NOTE
Hypertension is worsening.  Medication changes per orders.  Blood pressure will be reassessed at the next regular appointmentd/t intermittent elevated Bps at home will add back low dose of lisinopril.

## 2022-08-20 LAB
ALBUMIN SERPL-MCNC: 4.4 G/DL (ref 3.5–5.2)
ALBUMIN/GLOB SERPL: 2 G/DL
ALP SERPL-CCNC: 73 U/L (ref 39–117)
ALT SERPL W P-5'-P-CCNC: 17 U/L (ref 1–33)
ANION GAP SERPL CALCULATED.3IONS-SCNC: 12.2 MMOL/L (ref 5–15)
AST SERPL-CCNC: 16 U/L (ref 1–32)
BILIRUB SERPL-MCNC: 0.2 MG/DL (ref 0–1.2)
BUN SERPL-MCNC: 10 MG/DL (ref 6–20)
BUN/CREAT SERPL: 14.1 (ref 7–25)
CALCIUM SPEC-SCNC: 9.4 MG/DL (ref 8.6–10.5)
CHLORIDE SERPL-SCNC: 104 MMOL/L (ref 98–107)
CHOLEST SERPL-MCNC: 175 MG/DL (ref 0–200)
CO2 SERPL-SCNC: 24.8 MMOL/L (ref 22–29)
CREAT SERPL-MCNC: 0.71 MG/DL (ref 0.57–1)
EGFRCR SERPLBLD CKD-EPI 2021: 99.9 ML/MIN/1.73
GLOBULIN UR ELPH-MCNC: 2.2 GM/DL
GLUCOSE SERPL-MCNC: 74 MG/DL (ref 65–99)
HDLC SERPL-MCNC: 71 MG/DL (ref 40–60)
LDLC SERPL CALC-MCNC: 89 MG/DL (ref 0–100)
LDLC/HDLC SERPL: 1.24 {RATIO}
POTASSIUM SERPL-SCNC: 4.3 MMOL/L (ref 3.5–5.2)
PROT SERPL-MCNC: 6.6 G/DL (ref 6–8.5)
SODIUM SERPL-SCNC: 141 MMOL/L (ref 136–145)
TRIGL SERPL-MCNC: 81 MG/DL (ref 0–150)
TROPONIN T SERPL-MCNC: <0.01 NG/ML (ref 0–0.03)
TSH SERPL DL<=0.05 MIU/L-ACNC: 1.04 UIU/ML (ref 0.27–4.2)
VLDLC SERPL-MCNC: 15 MG/DL (ref 5–40)

## 2022-08-26 NOTE — PROGRESS NOTES
"[facility]  Heart and Valve Center    Chief Complaint  Chest Pain and Establish Care    Subjective    History of Present Illness {CC  Problem List  Visit  Diagnosis   Encounters  Notes  Medications  Labs  Result Review Imaging  Media :23}     Bhavani Coon is a 57 y.o. female with HTN, anxiety, hypothyroidism who presents today for evaluation of chest pain and hypertension at the request of Erin Rodas PA-C. Reports about 4 weeks ago started having intermittent chest, left arm and jaw pain. She reports that her BPs were running quite high, highest was 151/107. She was placed back on lisinopril. She notes associated diaphoresis, nausea and vomiting. She feels it is anxiety/stress. Her job is very stressful.  EKG showed LBBB. She reports that she is sedentary. Symptoms happen at rest and with activity.  She does not feel anxiety triggers events. Has not had any further chest pain for a week but still has hot flashes/diaphoresis. She believes the vomiting is from her vitamins on an empty, occurs twice a month. Denies shortness of breath    Reports that she had a LHC due to an abnormal EKG about 10 years ago     Cardiac risks: HTN, age    Objective     Vital Signs:   Vitals:    08/29/22 1417 08/29/22 1418 08/29/22 1420   BP: 126/86 122/75 128/79   BP Location: Right arm Left arm Left arm   Patient Position: Sitting Standing Sitting   Cuff Size: Adult Adult Adult   Pulse: 100 77 75   Resp:   18   Temp:   98 °F (36.7 °C)   TempSrc:   Temporal   SpO2: 93% 100% 100%   Weight:   54.2 kg (119 lb 8 oz)   Height:   150 cm (59.06\")     Body mass index is 24.09 kg/m².  Physical Exam  Vitals reviewed.   Constitutional:       Appearance: Normal appearance.   HENT:      Head: Normocephalic.   Neck:      Vascular: No carotid bruit.   Cardiovascular:      Rate and Rhythm: Normal rate and regular rhythm.      Pulses: Normal pulses.      Heart sounds: S1 normal and S2 normal. Murmur heard.    Systolic murmur " is present with a grade of 2/6.  Pulmonary:      Effort: Pulmonary effort is normal. No respiratory distress.      Breath sounds: Normal breath sounds.   Chest:      Chest wall: No tenderness.   Abdominal:      General: Abdomen is flat.      Palpations: Abdomen is soft.   Musculoskeletal:      Cervical back: Neck supple.      Right lower leg: No edema.      Left lower leg: No edema.   Skin:     General: Skin is warm and dry.   Neurological:      General: No focal deficit present.      Mental Status: She is alert and oriented to person, place, and time. Mental status is at baseline.   Psychiatric:         Mood and Affect: Mood normal.         Behavior: Behavior normal.         Thought Content: Thought content normal.              Result Review  Data Reviewed:{ Labs  Result Review  Imaging  Med Tab  Media :23}   ECG 12 Lead (08/19/2022 11:12)  Troponin (08/19/2022 10:49)  Manual Differential (08/19/2022 10:49)  TSH Rfx On Abnormal To Free T4 (08/19/2022 10:49)  CBC Auto Differential (08/19/2022 10:49)  Lipid Panel (08/19/2022 10:49)  Comprehensive Metabolic Panel (08/19/2022 10:49)    Consultant notes Erin Rodas PA-C            Assessment and Plan {CC Problem List  Visit Diagnosis  ROS  Review (Popup)  Health Maintenance  Quality  BestPractice  Medications  SmartSets  SnapShot Encounters  Media :23}   1. Stable angina (HCC)  Discussed symptoms with her and I advised her that I am very concerned about her symptoms especially being associated with a what appears to be new left bundle branch block.  Discussed LHC vs MPI with cardiology, due to insurance requirements will start with urgent MPI.   Start aspirin, metoprolol tartrate 25 mg twice a day and nitroglycerin as needed. Advised her to go to the ED if symptoms not improved after nitroglycerin.  - Adult Transthoracic Echo Complete W/ Cont if Necessary Per Protocol; Future    2. LBBB (left bundle branch block)  Appears to be new  Trying to get  records from 10 years ago  - Adult Transthoracic Echo Complete W/ Cont if Necessary Per Protocol; Future    3. Primary hypertension  Appears to be well controlled  Continue to monitor  - Adult Transthoracic Echo Complete W/ Cont if Necessary Per Protocol; Future      Follow Up {Instructions Charge Capture  Follow-up Communications :23}   Return if symptoms worsen or fail to improve.    Patient was given instructions and counseling regarding her condition or for health maintenance advice. Please see specific information pulled into the AVS if appropriate.  Advised to call the Heart and Valve Center with any questions, concerns, or worsening symptoms.

## 2022-08-29 ENCOUNTER — OFFICE VISIT (OUTPATIENT)
Dept: CARDIOLOGY | Facility: HOSPITAL | Age: 57
End: 2022-08-29

## 2022-08-29 VITALS
DIASTOLIC BLOOD PRESSURE: 79 MMHG | WEIGHT: 119.5 LBS | HEIGHT: 59 IN | HEART RATE: 75 BPM | SYSTOLIC BLOOD PRESSURE: 128 MMHG | OXYGEN SATURATION: 100 % | BODY MASS INDEX: 24.09 KG/M2 | TEMPERATURE: 98 F | RESPIRATION RATE: 18 BRPM

## 2022-08-29 DIAGNOSIS — I20.8 STABLE ANGINA: Primary | ICD-10-CM

## 2022-08-29 DIAGNOSIS — I44.7 LBBB (LEFT BUNDLE BRANCH BLOCK): ICD-10-CM

## 2022-08-29 DIAGNOSIS — I10 PRIMARY HYPERTENSION: ICD-10-CM

## 2022-08-29 PROCEDURE — 99214 OFFICE O/P EST MOD 30 MIN: CPT | Performed by: NURSE PRACTITIONER

## 2022-08-29 RX ORDER — NITROGLYCERIN 0.4 MG/1
TABLET SUBLINGUAL
Qty: 25 TABLET | Refills: 0 | Status: SHIPPED | OUTPATIENT
Start: 2022-08-29

## 2022-08-29 RX ORDER — ASPIRIN 81 MG/1
81 TABLET ORAL DAILY
Start: 2022-08-29

## 2022-08-30 ENCOUNTER — TELEPHONE (OUTPATIENT)
Dept: CARDIOLOGY | Facility: HOSPITAL | Age: 57
End: 2022-08-30

## 2022-08-30 ENCOUNTER — HOSPITAL ENCOUNTER (OUTPATIENT)
Dept: CARDIOLOGY | Facility: HOSPITAL | Age: 57
Discharge: HOME | End: 2022-08-30
Admitting: NURSE PRACTITIONER

## 2022-08-30 VITALS — HEIGHT: 59 IN | WEIGHT: 119.49 LBS | BODY MASS INDEX: 24.09 KG/M2

## 2022-08-30 DIAGNOSIS — I10 PRIMARY HYPERTENSION: ICD-10-CM

## 2022-08-30 DIAGNOSIS — I44.7 LBBB (LEFT BUNDLE BRANCH BLOCK): ICD-10-CM

## 2022-08-30 DIAGNOSIS — I20.8 STABLE ANGINA: ICD-10-CM

## 2022-08-30 LAB
ASCENDING AORTA: 3 CM
BH CV ECHO MEAS - AO MAX PG: 6.1 MMHG
BH CV ECHO MEAS - AO MEAN PG: 3 MMHG
BH CV ECHO MEAS - AO ROOT DIAM: 3 CM
BH CV ECHO MEAS - AO V2 MAX: 123 CM/SEC
BH CV ECHO MEAS - AO V2 VTI: 24.7 CM
BH CV ECHO MEAS - AVA(I,D): 1.79 CM2
BH CV ECHO MEAS - EDV(CUBED): 58.4 ML
BH CV ECHO MEAS - EDV(MOD-SP2): 63 ML
BH CV ECHO MEAS - EDV(MOD-SP4): 54 ML
BH CV ECHO MEAS - EF(MOD-BP): 57 %
BH CV ECHO MEAS - EF(MOD-SP2): 57.1 %
BH CV ECHO MEAS - EF(MOD-SP4): 55.6 %
BH CV ECHO MEAS - ESV(CUBED): 24.6 ML
BH CV ECHO MEAS - ESV(MOD-SP2): 27 ML
BH CV ECHO MEAS - ESV(MOD-SP4): 24 ML
BH CV ECHO MEAS - FS: 25 %
BH CV ECHO MEAS - IVS/LVPW: 0.76 CM
BH CV ECHO MEAS - IVSD: 0.85 CM
BH CV ECHO MEAS - LAT PEAK E' VEL: 9.5 CM/SEC
BH CV ECHO MEAS - LV DIASTOLIC VOL/BSA (35-75): 36.5 CM2
BH CV ECHO MEAS - LV MASS(C)D: 117.5 GRAMS
BH CV ECHO MEAS - LV MAX PG: 2.29 MMHG
BH CV ECHO MEAS - LV MEAN PG: 1 MMHG
BH CV ECHO MEAS - LV SYSTOLIC VOL/BSA (12-30): 16.2 CM2
BH CV ECHO MEAS - LV V1 MAX: 75.7 CM/SEC
BH CV ECHO MEAS - LV V1 VTI: 15.6 CM
BH CV ECHO MEAS - LVIDD: 3.9 CM
BH CV ECHO MEAS - LVIDS: 2.9 CM
BH CV ECHO MEAS - LVOT AREA: 2.8 CM2
BH CV ECHO MEAS - LVOT DIAM: 1.9 CM
BH CV ECHO MEAS - LVPWD: 1.11 CM
BH CV ECHO MEAS - MED PEAK E' VEL: 5.4 CM/SEC
BH CV ECHO MEAS - MV A MAX VEL: 89.8 CM/SEC
BH CV ECHO MEAS - MV DEC TIME: 0.17 MSEC
BH CV ECHO MEAS - MV E MAX VEL: 62 CM/SEC
BH CV ECHO MEAS - MV E/A: 0.69
BH CV ECHO MEAS - MV MAX PG: 3.7 MMHG
BH CV ECHO MEAS - MV MEAN PG: 2 MMHG
BH CV ECHO MEAS - MV V2 VTI: 19.9 CM
BH CV ECHO MEAS - MVA(VTI): 2.22 CM2
BH CV ECHO MEAS - PA ACC SLOPE: 838.5 CM/SEC2
BH CV ECHO MEAS - PA ACC TIME: 0.1 SEC
BH CV ECHO MEAS - PA PR(ACCEL): 33.8 MMHG
BH CV ECHO MEAS - PA V2 MAX: 95.6 CM/SEC
BH CV ECHO MEAS - RAP SYSTOLE: 3 MMHG
BH CV ECHO MEAS - RVSP: 17 MMHG
BH CV ECHO MEAS - SI(MOD-SP2): 24.3 ML/M2
BH CV ECHO MEAS - SI(MOD-SP4): 20.3 ML/M2
BH CV ECHO MEAS - SV(LVOT): 44.2 ML
BH CV ECHO MEAS - SV(MOD-SP2): 36 ML
BH CV ECHO MEAS - SV(MOD-SP4): 30 ML
BH CV ECHO MEAS - TAPSE (>1.6): 2.1 CM
BH CV ECHO MEAS - TR MAX PG: 14.9 MMHG
BH CV ECHO MEAS - TR MAX VEL: 193 CM/SEC
BH CV ECHO MEASUREMENTS AVERAGE E/E' RATIO: 8.32
BH CV VAS BP LEFT ARM: NORMAL MMHG
BH CV XLRA - RV BASE: 2.8 CM
BH CV XLRA - RV LENGTH: 6.5 CM
BH CV XLRA - RV MID: 2.2 CM
BH CV XLRA - TDI S': 11.1 CM/SEC
IVRT: 109 MSEC
LEFT ATRIUM VOLUME INDEX: 18.2 ML/M2
MAXIMAL PREDICTED HEART RATE: 164 BPM
STRESS TARGET HR: 139 BPM

## 2022-08-30 PROCEDURE — 93306 TTE W/DOPPLER COMPLETE: CPT

## 2022-08-30 PROCEDURE — 93306 TTE W/DOPPLER COMPLETE: CPT | Performed by: INTERNAL MEDICINE

## 2022-08-30 NOTE — TELEPHONE ENCOUNTER
----- Message from Bhavani Coon sent at 8/30/2022  4:06 PM EDT -----  Regarding: Heart  I see all is good with the test results. I don’t see how I can have a problem. I no you are saying the stress test is different but I just can’t see me doing it.  I am sure the meds you gave me will work.  I just feel with all of this good news that I can’t have a blockage. I don’t understand with this test being so good how I can have a issue.  The left branch block was present but it was all good from this test. I just don’t see a reason to do the stress test.  I think I am going to just not do it.  I just feel like the blockage would be here.

## 2022-09-06 ENCOUNTER — APPOINTMENT (OUTPATIENT)
Dept: CARDIOLOGY | Facility: HOSPITAL | Age: 57
End: 2022-09-06

## 2022-09-28 ENCOUNTER — HOSPITAL ENCOUNTER (OUTPATIENT)
Dept: CARDIOLOGY | Facility: HOSPITAL | Age: 57
Discharge: HOME | End: 2022-09-28
Admitting: NURSE PRACTITIONER

## 2022-09-28 DIAGNOSIS — I44.7 LBBB (LEFT BUNDLE BRANCH BLOCK): ICD-10-CM

## 2022-09-28 DIAGNOSIS — I10 PRIMARY HYPERTENSION: ICD-10-CM

## 2022-09-28 DIAGNOSIS — I20.8 STABLE ANGINA: ICD-10-CM

## 2022-09-28 LAB
MAXIMAL PREDICTED HEART RATE: 164 BPM
STRESS TARGET HR: 139 BPM

## 2022-09-28 PROCEDURE — 0 TECHNETIUM SESTAMIBI: Performed by: NURSE PRACTITIONER

## 2022-09-28 PROCEDURE — A9500 TC99M SESTAMIBI: HCPCS | Performed by: NURSE PRACTITIONER

## 2022-09-28 RX ADMIN — TECHNETIUM TC 99M SESTAMIBI 1 DOSE: 1 INJECTION INTRAVENOUS at 13:00

## 2022-09-29 ENCOUNTER — TELEPHONE (OUTPATIENT)
Dept: CARDIOLOGY | Facility: HOSPITAL | Age: 57
End: 2022-09-29

## 2022-09-29 NOTE — TELEPHONE ENCOUNTER
----- Message from Jason Allen MA sent at 9/29/2022 11:16 AM EDT -----  Hello I received a call yesterday from the [facility] lab, Jenae ext [phone],I returned her call and she stated the patient has claustrophobia, and it was known about but patient agreed to do the test, but according to Jenae the patient was unable to complete it. She had a Panic attack and crawled out of the machine. Alyson stated she believed there isn't a way to do the test without sedation, but she did suggest maybe doing a stress Echo instead, otherwise sedation may be appropriate.

## 2022-09-29 NOTE — TELEPHONE ENCOUNTER
Spoke with Jenae CARLOS. Patient needs lexiscan rescheduled ASAP to be done with sedation. Per Jenae, she is aware that she needs a . Ela, can you please reschedule this for ASAP?

## 2022-10-05 ENCOUNTER — TELEPHONE (OUTPATIENT)
Dept: CARDIOLOGY | Facility: HOSPITAL | Age: 57
End: 2022-10-05

## 2022-10-05 NOTE — TELEPHONE ENCOUNTER
SHAWANDA Wiggins, Nori Astorga, APRN [facility] and Jenna both agree that Ms. Coon may need to go to [place] to do the stress exam in the open chair format. Seeing all the meds she is on, they do not feel comfortable prescribing anything from their standpoint. Ok with you? I'll schedule and call patient. Thanks

## 2022-10-05 NOTE — TELEPHONE ENCOUNTER
----- Message from SHAWANDA Wiggins sent at 10/4/2022  2:04 PM EDT -----  I sent it to Duane, who was sending it to Jenna (RN in dept) to coordinate. I'll follow up with them.     ----- Message -----  From: Nori Oden APRN  Sent: 10/4/2022   1:52 PM EDT  To: SHAWANDA Wiggins    Were you able to get her stress test rescheduled? She was the claustrophobic patient that was unable to do the Lexiscan so I gave you a message last week to have Duane schedule it ASAP with anxiolytic.

## 2022-10-10 ENCOUNTER — TELEPHONE (OUTPATIENT)
Dept: CARDIOLOGY | Facility: HOSPITAL | Age: 57
End: 2022-10-10

## 2022-10-10 ENCOUNTER — HOSPITAL ENCOUNTER (OUTPATIENT)
Dept: CARDIOLOGY | Facility: HOSPITAL | Age: 57
Discharge: HOME | End: 2022-10-10

## 2022-10-10 DIAGNOSIS — I20.8 STABLE ANGINA: ICD-10-CM

## 2022-10-10 DIAGNOSIS — R07.9 CHEST PAIN, UNSPECIFIED TYPE: ICD-10-CM

## 2022-10-10 DIAGNOSIS — I44.7 LBBB (LEFT BUNDLE BRANCH BLOCK): Primary | ICD-10-CM

## 2022-10-10 NOTE — TELEPHONE ENCOUNTER
Received message that patient was unable to do C-Cam today due to severe anxiety. She is rescheduled for 10/21 and plans to discuss anxiety with her PCP. She denies current chest pain but did have some a couple of days ago but she is not sure if it was her anxiety. Will go ahead and refer to cardiology due to underlying LBBB. Encouraged her to discuss anxiolytic with her PCP and keep appt for 10/21.

## 2022-10-11 NOTE — PROGRESS NOTES
Here for physical/pap    Diet:  healthy though does like red meat,  does a daily MVI  Caffeine- 1-2 per day, drinks lots of water. ETOH:none.   Exercise: not been exercising recently    Chest pain - she was unable to do Lexiscan because of claustrophobia. She was then scheduled in [place] for an open chair stress test but had an anxiety attack. She has had episodes of chest pain, and EKG shows LBBB.   I looked through her old chart on [facility], and she did have IBBB and underwent a LHC by Dr Ceballos at [facility] in '12. LHC was negative. There was another EKG from '15 that showed a LBBB.  She does continue to have episodes of sharp chest pain that lasts just 20-30 seconds. Not exertional. Initially did go into jaw and left arm, but not recently. Doesn't seem to be related to anxiety or stress. Not related to food. No GERD generally    she had LEEP in '95, and had a supracervical hysterectomy and USO in '09. She has had hot flashes for a few years, but worse recently. Day and night. Her mother and grandmother both had breast cancer    Fever blister-she is on acyclovir bid and will take more when she gets a fever blister or a genital herpes outbreak.       Hypothyroid - she is on synthroid 50 and last TSH was in 8/22 and was normal.    HTN - she is on  Lisinopril. Has not started the Metoprolol     Opioid addiction - she is on suboxone through Dr Cowart and is weaning gradually    Anxiety - she sees Aditya Polanco and is on wellbutirn and clonidine. Overall she has felt very well and happier than she has in a long time    B shoulder pain - she sees ortho and gets cortisone injections periodicially        Current Outpatient Medications:   •  acyclovir (ZOVIRAX) 400 MG tablet, TAKE ONE TABLET BY MOUTH THREE TIMES A DAY FOR 5 DAYS AS NEEDED FOR BREAKOUTS, THEN TAKE 1 TABLET BY MOUTH TWO TIMES A DAY BETWEEN BREAKOUTS, Disp: 90 tablet, Rfl: 11  •  buprenorphine-naloxone (SUBOXONE) 8-2 MG per SL tablet, Place 1 tablet  under the tongue Daily., Disp: , Rfl:   •  buPROPion XL (WELLBUTRIN XL) 300 MG 24 hr tablet, Take 300 mg by mouth Daily., Disp: , Rfl:   •  cloNIDine (CATAPRES) 0.1 MG tablet, Take 0.1 mg by mouth 2 (Two) Times a Day As Needed for High Blood Pressure., Disp: , Rfl:   •  levothyroxine (SYNTHROID, LEVOTHROID) 50 MCG tablet, Take 1 tablet by mouth Daily., Disp: 30 tablet, Rfl: 5  •  lisinopril (PRINIVIL,ZESTRIL) 10 MG tablet, Take 1 tablet by mouth Daily., Disp: 30 tablet, Rfl: 5  •  meloxicam (MOBIC) 15 MG tablet, 1 PO Daily with food. (Patient taking differently: Take 1 tablet by mouth Daily As Needed. 1 PO Daily with food.), Disp: 90 tablet, Rfl: 2  •  nitroglycerin (NITROSTAT) 0.4 MG SL tablet, 1 under the tongue as needed for angina, may repeat q5mins for up three doses, Disp: 25 tablet, Rfl: 0  •  aspirin (aspirin) 81 MG EC tablet, Take 1 tablet by mouth Daily., Disp: , Rfl:   •  diazePAM (Valium) 2 MG tablet, Take 1 tablet 15 minutes before the procedure, Disp: 1 tablet, Rfl: 0  •  metoprolol tartrate (LOPRESSOR) 25 MG tablet, Take 1 tablet by mouth 2 (Two) Times a Day., Disp: 60 tablet, Rfl: 3    The following portions of the patient's history were reviewed and updated as appropriate: allergies, current medications, past family history, past medical history, past social history, past surgical history and problem list.    Review of Systems   Constitutional: Negative for activity change, appetite change, fever, unexpected weight gain and unexpected weight loss.   HENT: Negative.    Eyes: Negative.    Respiratory: Negative for shortness of breath and wheezing.    Cardiovascular: Positive for chest pain. Negative for palpitations and leg swelling.   Gastrointestinal: Negative.    Endocrine: Positive for heat intolerance (sweats a lot). Negative for cold intolerance.   Genitourinary: Negative for difficulty urinating and dysuria.   Musculoskeletal: Positive for arthralgias.   Skin: Negative.    Allergic/Immunologic:  "Negative for immunocompromised state.   Neurological: Negative for seizures, speech difficulty, memory problem and confusion.   Hematological: Does not bruise/bleed easily.   Psychiatric/Behavioral: Negative for agitation. The patient is nervous/anxious.          Objective    /74 (BP Location: Right arm, Patient Position: Sitting)   Pulse 80   Temp 96.8 °F (36 °C) (Infrared)   Ht 148.9 cm (58.63\")   Wt 55.8 kg (123 lb)   LMP  (LMP Unknown)   SpO2 98%   BMI 25.16 kg/m²   Physical Exam   Physical Exam  Vitals and nursing note reviewed.   Constitutional:       General: She is not in acute distress.     Appearance: She is well-developed. She is not diaphoretic.   HENT:      Head: Normocephalic and atraumatic.      Right Ear: External ear normal.      Left Ear: External ear normal.      Nose: Nose normal.      Mouth/Throat:      Pharynx: No oropharyngeal exudate.   Eyes:      General: No scleral icterus.        Right eye: No discharge.         Left eye: No discharge.      Conjunctiva/sclera: Conjunctivae normal.      Pupils: Pupils are equal, round, and reactive to light.   Neck:      Thyroid: No thyromegaly.      Comments: Left occiptal node - same size as in the past  Cardiovascular:      Rate and Rhythm: Normal rate and regular rhythm.      Heart sounds: Normal heart sounds. No murmur heard.    No friction rub. No gallop.   Pulmonary:      Effort: Pulmonary effort is normal. No respiratory distress.      Breath sounds: Normal breath sounds. No wheezing or rales.   Chest:   Breasts:     Right: No mass, nipple discharge, skin change or tenderness.      Left: No mass, nipple discharge, skin change or tenderness.   Abdominal:      General: Bowel sounds are normal. There is no distension.      Palpations: Abdomen is soft. There is no mass.      Tenderness: There is no abdominal tenderness. There is no guarding or rebound.   Genitourinary:     Vagina: Normal. No vaginal discharge.      Rectum: Guaiac result " negative.   Musculoskeletal:         General: No deformity. Normal range of motion.      Cervical back: Normal range of motion and neck supple.   Lymphadenopathy:      Cervical: No cervical adenopathy.   Skin:     General: Skin is warm and dry.      Coloration: Skin is not pale.      Findings: Lesion (probable SK on her right face; hemangioma on left arm) present. No erythema or rash.   Neurological:      Mental Status: She is alert and oriented to person, place, and time.      Coordination: Coordination normal.      Deep Tendon Reflexes: Reflexes normal.   Psychiatric:         Mood and Affect: Mood normal.         Behavior: Behavior normal.         Thought Content: Thought content normal.         Judgment: Judgment normal.           Assessment/Plan   Diagnoses and all orders for this visit:    1. Routine general medical examination at a health care facility (Primary)  -     POC Urinalysis Dipstick, Automated  Regular exercise/healthy diet. BSE q month. Sunscreen use encouraged.we won't do labs today since she had in 8/22  Mary Alice due now- she will call to schedule, numbers given  Colon due 4/29 (Dr Vazquez)  DT due this year - give today  DEXA due age 66  Shingles- shingrix discussed -  can check at pharmacy since we do not have   Flu shot- today  covid bivalent- today  Will do pap as she does still have cervix  Don't think we would want to do HRT w/ FH of breast cancer. We discussed some lifestyle modifications. We could consider effexor. We'll hav her return in a few months after she finishes her cardiac workup to discuss options  Skin lesions appear benign. She might go ahead and get in with dermatology too for full skin check    2. Routine gynecological examination    3. Need for influenza vaccination  -     FluLaval/Fluzone >6 mos ([id number])    4. Need for COVID-19 vaccine  -     COVID-19 Bivalent Booster (Pfizer) 12+yrs    5. Hypothyroidism (acquired)- we can recheck thyroid labs in 8/23  -     levothyroxine  (SYNTHROID, LEVOTHROID) 50 MCG tablet; Take 1 tablet by mouth Daily.  Dispense: 30 tablet; Refill: 5    6. Hypertension, unspecified type  -     lisinopril (PRINIVIL,ZESTRIL) 10 MG tablet; Take 1 tablet by mouth Daily.  Dispense: 30 tablet; Refill: 5    7. Other chest pain- scheduled for a stress test    8. Situational anxiety- we will do one dose of valium 2mg 15 minutes before the procedure to help w/ the anxiety. She is on suboxone, but I think it should be OK to use w/ this. She will have a  day of test.   -     diazePAM (Valium) 2 MG tablet; Take 1 tablet 15 minutes before the procedure  Dispense: 1 tablet; Refill: 0

## 2022-10-13 ENCOUNTER — OFFICE VISIT (OUTPATIENT)
Dept: INTERNAL MEDICINE | Facility: CLINIC | Age: 57
End: 2022-10-13

## 2022-10-13 VITALS
BODY MASS INDEX: 24.8 KG/M2 | HEIGHT: 59 IN | WEIGHT: 123 LBS | TEMPERATURE: 96.8 F | DIASTOLIC BLOOD PRESSURE: 74 MMHG | OXYGEN SATURATION: 98 % | SYSTOLIC BLOOD PRESSURE: 122 MMHG | HEART RATE: 80 BPM

## 2022-10-13 DIAGNOSIS — Z23 NEED FOR INFLUENZA VACCINATION: ICD-10-CM

## 2022-10-13 DIAGNOSIS — Z01.419 ROUTINE GYNECOLOGICAL EXAMINATION: ICD-10-CM

## 2022-10-13 DIAGNOSIS — I10 HYPERTENSION, UNSPECIFIED TYPE: ICD-10-CM

## 2022-10-13 DIAGNOSIS — F41.8 SITUATIONAL ANXIETY: ICD-10-CM

## 2022-10-13 DIAGNOSIS — Z00.00 ROUTINE GENERAL MEDICAL EXAMINATION AT A HEALTH CARE FACILITY: Primary | ICD-10-CM

## 2022-10-13 DIAGNOSIS — E03.9 HYPOTHYROIDISM (ACQUIRED): ICD-10-CM

## 2022-10-13 DIAGNOSIS — R07.89 OTHER CHEST PAIN: ICD-10-CM

## 2022-10-13 DIAGNOSIS — Z23 NEED FOR COVID-19 VACCINE: ICD-10-CM

## 2022-10-13 LAB
BILIRUB BLD-MCNC: NEGATIVE MG/DL
CLARITY, POC: CLEAR
COLOR UR: YELLOW
EXPIRATION DATE: NORMAL
GLUCOSE UR STRIP-MCNC: NEGATIVE MG/DL
KETONES UR QL: NEGATIVE
LEUKOCYTE EST, POC: NEGATIVE
Lab: NORMAL
NITRITE UR-MCNC: NEGATIVE MG/ML
PH UR: 5.5 [PH] (ref 5–8)
PROT UR STRIP-MCNC: NEGATIVE MG/DL
RBC # UR STRIP: NEGATIVE /UL
SP GR UR: 1.02 (ref 1–1.03)
UROBILINOGEN UR QL: NORMAL

## 2022-10-13 PROCEDURE — 90686 IIV4 VACC NO PRSV 0.5 ML IM: CPT | Performed by: INTERNAL MEDICINE

## 2022-10-13 PROCEDURE — 99213 OFFICE O/P EST LOW 20 MIN: CPT | Performed by: INTERNAL MEDICINE

## 2022-10-13 PROCEDURE — 82270 OCCULT BLOOD FECES: CPT | Performed by: INTERNAL MEDICINE

## 2022-10-13 PROCEDURE — 99396 PREV VISIT EST AGE 40-64: CPT | Performed by: INTERNAL MEDICINE

## 2022-10-13 PROCEDURE — 0124A PR ADM SARSCOV2 30MCG/0.3ML BST: CPT | Performed by: INTERNAL MEDICINE

## 2022-10-13 PROCEDURE — 91312 COVID-19 (PFIZER) BIVALENT BOOSTER 12+YRS: CPT | Performed by: INTERNAL MEDICINE

## 2022-10-13 PROCEDURE — 90471 IMMUNIZATION ADMIN: CPT | Performed by: INTERNAL MEDICINE

## 2022-10-13 PROCEDURE — 81003 URINALYSIS AUTO W/O SCOPE: CPT | Performed by: INTERNAL MEDICINE

## 2022-10-13 RX ORDER — LEVOTHYROXINE SODIUM 0.05 MG/1
50 TABLET ORAL DAILY
Qty: 30 TABLET | Refills: 5 | Status: SHIPPED | OUTPATIENT
Start: 2022-10-13

## 2022-10-13 RX ORDER — DIAZEPAM 2 MG/1
TABLET ORAL
Qty: 1 TABLET | Refills: 0 | Status: SHIPPED | OUTPATIENT
Start: 2022-10-13 | End: 2023-02-15

## 2022-10-13 RX ORDER — LISINOPRIL 10 MG/1
10 TABLET ORAL DAILY
Qty: 30 TABLET | Refills: 5 | Status: SHIPPED | OUTPATIENT
Start: 2022-10-13

## 2022-10-17 LAB — REF LAB TEST METHOD: NORMAL

## 2022-10-21 ENCOUNTER — APPOINTMENT (OUTPATIENT)
Dept: CARDIOLOGY | Facility: HOSPITAL | Age: 57
End: 2022-10-21

## 2022-10-24 ENCOUNTER — OFFICE VISIT (OUTPATIENT)
Dept: ORTHOPEDIC SURGERY | Facility: CLINIC | Age: 57
End: 2022-10-24

## 2022-10-24 VITALS — WEIGHT: 123.02 LBS | HEIGHT: 59 IN | BODY MASS INDEX: 24.8 KG/M2

## 2022-10-24 DIAGNOSIS — M19.011 OSTEOARTHRITIS OF BOTH GLENOHUMERAL JOINTS: Primary | ICD-10-CM

## 2022-10-24 DIAGNOSIS — M19.012 OSTEOARTHRITIS OF BOTH GLENOHUMERAL JOINTS: Primary | ICD-10-CM

## 2022-10-24 DIAGNOSIS — M25.512 CHRONIC PAIN OF BOTH SHOULDERS: ICD-10-CM

## 2022-10-24 DIAGNOSIS — G89.29 CHRONIC PAIN OF BOTH SHOULDERS: ICD-10-CM

## 2022-10-24 DIAGNOSIS — M25.511 CHRONIC PAIN OF BOTH SHOULDERS: ICD-10-CM

## 2022-10-24 PROCEDURE — 99214 OFFICE O/P EST MOD 30 MIN: CPT | Performed by: PHYSICIAN ASSISTANT

## 2022-10-24 PROCEDURE — 20610 DRAIN/INJ JOINT/BURSA W/O US: CPT | Performed by: PHYSICIAN ASSISTANT

## 2022-10-24 RX ADMIN — LIDOCAINE HYDROCHLORIDE 4 ML: 10 INJECTION, SOLUTION EPIDURAL; INFILTRATION; INTRACAUDAL; PERINEURAL at 09:15

## 2022-10-24 RX ADMIN — TRIAMCINOLONE ACETONIDE 40 MG: 40 INJECTION, SUSPENSION INTRA-ARTICULAR; INTRAMUSCULAR at 09:15

## 2022-10-24 NOTE — PROGRESS NOTES
Procedure   - Large Joint Arthrocentesis: bilateral glenohumeral on 10/24/2022 9:15 AM  Indications: pain  Details: 22 G needle, posterior approach  Medications (Right): 4 mL lidocaine PF 1% 1 %; 40 mg triamcinolone acetonide 40 MG/ML  Medications (Left): 4 mL lidocaine PF 1% 1 %; 40 mg triamcinolone acetonide 40 MG/ML  Outcome: tolerated well, no immediate complications  Procedure, treatment alternatives, risks and benefits explained, specific risks discussed. Consent was given by the patient. Immediately prior to procedure a time out was called to verify the correct patient, procedure, equipment, support staff and site/side marked as required. Patient was prepped and draped in the usual sterile fashion.

## 2022-10-24 NOTE — PROGRESS NOTES
"    [facility] Note        Subjective     CC: Follow-up (21 months- Osteoarthritis of both glenohumeral joints )      HPI    Bhavani Coon is a 57 y.o. female.  Patient returns for follow-up bilateral shoulder pain.  She is well-known to me with known advanced glenohumeral osteoarthritis.  Patient has been receiving corticosteroid injections to the glenohumeral joint for pain control.  Last injections were given 11/13/2020.  She is here today because she is having worsening pain and further limitation with regards to range of motion secondary to the advancing arthritis.    Right worse than left.    Pain scale: 5-8/10.  Associated symptoms popping, grinding, giving away.  Pain is worse with working, leisure, attempting to lie on the affected side.  No reported numbness or tingling.    Overall, patient's symptoms are worse.    ROS:    Constiutional:Pt denies fever, chills, nausea, or vomiting.  MSK:as above        Objective      Past Medical History  Past Medical History:   Diagnosis Date   • Abnormal EKG 2012    Dr Ceballos   • Alpha thalassemia (HCC)    • Anxiety    • Cyst of thyroid determined by ultrasound 10/2013    2 small colloid cysts, otherwise nml   • H/O mammogram 2017    [facility]   • Hemorrhoid    • Herpes simplex    • History of MRI of lumbar spine 01/2015    deg, post surgical changes   • Hx MRSA infection 06/2014    R leg   • Hx of mammogram 2014    dx'ic - probable benign calcifications, repeat in 6m   • Hypertension    • LBBB (left bundle branch block) 2014   • LBBB (left bundle branch block) 08/30/2022   • Microcytic anemia    • Neck mass 2015    left -saw ENT in 2016-felt to be lipoma   • Pap smear for cervical cancer screening 2017    Dr. Pugh   • Plantar fasciitis of left foot    • Restless legs syndrome (RLS)    • Routine general medical examination at a health care facility    • Uncomplicated opioid dependence (HCC)          Physical Exam  Ht 148.9 cm (58.62\")   Wt 55.8 kg " (123 lb 0.3 oz)   LMP  (LMP Unknown)   BMI 25.17 kg/m²     Body mass index is 25.17 kg/m².    Patient is well nourished and well developed.        Ortho Exam  Bilateral shoulders  Skin: Grossly intact without any redness warmth or swelling.  No rashes or lesions.  Tenderness: Diffuse tenderness noted throughout both shoulders.  Motion: Significantly limited with forward flexion 100 degrees, abduction to 90 degrees, external rotation 25 degrees, internal rotation side of body.  All motion causes exacerbation of pain.  Deltoid: Intact  Rotator cuff strength: 5-/5.  Motor/sensory: Grossly intact C5-T1.      Imaging/Labs/EMG Reviewed:  Ordered bilateral shoulders plain films.  Imaging read/interpreted by Dr. Rueda.    Imaging Results (Last 24 Hours)     Procedure Component Value Units Date/Time    XR Shoulder 2+ View Bilateral [[id number]] Resulted: 10/24/22 1218     Updated: 10/24/22 1221    Narrative:      Right Shoulder Radiographs  Indication: right shoulder pain  Views: AP, outlet and axillary views of the right shoulder    Comparison: 11/13/2020    Findings:  Advanced arthritis, with worsening arthritis compared to the previous   imaging, with bone-on-bone contact, large inferior humeral head   osteophyte, glenoid erosion posteriorly, no acute bony abnormalities.  No   unusual bony features.      Left Shoulder Radiographs  Indication: left shoulder pain  Views: AP, outlet and axillary views of the left shoulder    Comparison: 11/13/2020    Findings:  Advanced arthritis, with worsening arthritis compared to the previous   imaging, with bone-on-bone contact, large inferior humeral head   osteophyte, glenoid erosion posteriorly, no acute bony abnormalities.  No   unusual bony features.          Assessment:  1. Osteoarthritis of both glenohumeral joints    2. Chronic pain of both shoulders        Plan:  1. Osteoarthritis bilateral glenohumeral joints causing chronic pain--imaging shows worsening/advancing arthritis  compared to prior films.  2. Long discussion was had with the patient regarding proceeding with TSA.  Patient would like to discuss with Dr. Rueda.  3. For today she would like to proceed with bilateral corticosteroid injections to the glenohumeral joints which were given.  4. Continue use of Tylenol for pain control.  5. Schedule follow-up with Dr. Rueda in 6 weeks to discuss possible TSA.  6. Questions and concerns answered.    After discussing the risks, benefits, indications of injection, the patient gave consent to proceed.  Bilateral shoulders were confirmed as the correct joints to be injected with a timeout.  Each was then prepped using Hibiclens and injected with a mixture of 4 cc of 1% plain lidocaine and 1 cc of Kenalog (40 mg per mL), without any resistance through the posterior approach, patient in seated position.  Area was cleaned, hemostasis was achieved and a Band-Aid was applied over the injection site.  The patient tolerated procedure well.  I instructed the patient on signs and symptoms of infection.  They should report to the emergency department or return to clinic if any of these develop, for further evaluation and treatment.  Recommended modifying activity for the next 48 hours to include rest, ice, elevation and oral pain medication as needed.        Jennifer Nieves PA-C  10/25/22  10:14 EDT      Dictated Utilizing Dragon Dictation.

## 2022-10-25 ENCOUNTER — TRANSCRIBE ORDERS (OUTPATIENT)
Dept: ADMINISTRATIVE | Facility: HOSPITAL | Age: 57
End: 2022-10-25

## 2022-10-25 DIAGNOSIS — Z12.31 VISIT FOR SCREENING MAMMOGRAM: Primary | ICD-10-CM

## 2022-10-25 RX ORDER — LIDOCAINE HYDROCHLORIDE 10 MG/ML
4 INJECTION, SOLUTION EPIDURAL; INFILTRATION; INTRACAUDAL; PERINEURAL
Status: COMPLETED | OUTPATIENT
Start: 2022-10-24 | End: 2022-10-24

## 2022-10-25 RX ORDER — TRIAMCINOLONE ACETONIDE 40 MG/ML
40 INJECTION, SUSPENSION INTRA-ARTICULAR; INTRAMUSCULAR
Status: COMPLETED | OUTPATIENT
Start: 2022-10-24 | End: 2022-10-24

## 2022-10-27 ENCOUNTER — TELEPHONE (OUTPATIENT)
Dept: ORTHOPEDIC SURGERY | Facility: CLINIC | Age: 57
End: 2022-10-27

## 2022-10-27 DIAGNOSIS — M19.012 OSTEOARTHRITIS OF BOTH GLENOHUMERAL JOINTS: Primary | ICD-10-CM

## 2022-10-27 DIAGNOSIS — M19.011 OSTEOARTHRITIS OF BOTH GLENOHUMERAL JOINTS: Primary | ICD-10-CM

## 2022-10-27 DIAGNOSIS — F41.8 SITUATIONAL ANXIETY: ICD-10-CM

## 2022-10-27 NOTE — TELEPHONE ENCOUNTER
Called patient to see if she can get her CT scan done on 11/2 at 4:00 pm at our [facility] location. LVM asking for a call back.

## 2022-11-02 ENCOUNTER — APPOINTMENT (OUTPATIENT)
Dept: CT IMAGING | Facility: HOSPITAL | Age: 57
End: 2022-11-02

## 2022-11-17 ENCOUNTER — APPOINTMENT (OUTPATIENT)
Dept: MAMMOGRAPHY | Facility: HOSPITAL | Age: 57
End: 2022-11-17

## 2022-11-21 RX ORDER — ONDANSETRON 4 MG/1
4 TABLET, ORALLY DISINTEGRATING ORAL EVERY 8 HOURS PRN
Qty: 20 TABLET | Refills: 0 | Status: SHIPPED | OUTPATIENT
Start: 2022-11-21 | End: 2022-12-14 | Stop reason: SDUPTHER

## 2022-11-21 RX ORDER — ALBUTEROL SULFATE 90 UG/1
2 AEROSOL, METERED RESPIRATORY (INHALATION) EVERY 4 HOURS PRN
Qty: 18 G | Refills: 0 | Status: SHIPPED | OUTPATIENT
Start: 2022-11-21 | End: 2023-02-15

## 2022-12-10 NOTE — PROGRESS NOTES
Answers for HPI/ROS submitted by the patient on 12/8/2022  Please describe your symptoms.: Follow up appt  Have you had these symptoms before?: No  How long have you been having these symptoms?: 1-2 weeks  What is the primary reason for your visit?: Other    Chief Complaint  Hypothyroidism, Hypertension, Chest Pain (F/u), and Vomiting (Since she has had covid she has had nausea and vomiting.)    Subjective          Bhavani Coon presents to [facility]  History of Present Illness    COVID infection-patient tested positive last month and felt terrible for several weeks with URI symptoms and GI symptoms and is feeling better, but still has some n/v intermittently.  Not daily but can be every couple days.  She uses zofran as needed which does seem to help. She has seen small amounts of blood in the emesis 3x over last month, last episode was 4 days ago.  Does not have much of an appetite still and is mostly eating popcorn and oranges.  She has not used mobic for several months but she was taking the OTC NSAIDs when she was sick.  She has stopped these  She has never had EGD.  Bowel movements can be loose at times  Cough and congestion have improved for the most part and she used the albuterol a few times     Chest pain- she never did the stress test. Not having the chest pain.  She also canceled cardiology follow-up    Hypothyroid - she is on synthroid 50 and last TSH was in 8/22 and was normal.     HTN - she is on  Lisinopril. She never went on the metoprolol.  Blood pressures at home have been good overall        Current Outpatient Medications:   •  acyclovir (ZOVIRAX) 400 MG tablet, TAKE ONE TABLET BY MOUTH THREE TIMES A DAY FOR 5 DAYS AS NEEDED FOR BREAKOUTS, THEN TAKE 1 TABLET BY MOUTH TWO TIMES A DAY BETWEEN BREAKOUTS, Disp: 90 tablet, Rfl: 11  •  albuterol sulfate  (90 Base) MCG/ACT inhaler, Inhale 2 puffs Every 4 (Four) Hours As Needed for Shortness of Air., Disp: 18 g,  "Rfl: 0  •  aspirin (aspirin) 81 MG EC tablet, Take 1 tablet by mouth Daily., Disp: , Rfl:   •  buprenorphine-naloxone (SUBOXONE) 8-2 MG per SL tablet, Place 1 tablet under the tongue Daily., Disp: , Rfl:   •  buPROPion XL (WELLBUTRIN XL) 300 MG 24 hr tablet, Take 300 mg by mouth Daily., Disp: , Rfl:   •  cloNIDine (CATAPRES) 0.1 MG tablet, Take 0.1 mg by mouth 2 (Two) Times a Day As Needed for High Blood Pressure., Disp: , Rfl:   •  diazePAM (Valium) 2 MG tablet, Take 1 tablet 15 minutes before the procedure, Disp: 1 tablet, Rfl: 0  •  levothyroxine (SYNTHROID, LEVOTHROID) 50 MCG tablet, Take 1 tablet by mouth Daily., Disp: 30 tablet, Rfl: 5  •  lisinopril (PRINIVIL,ZESTRIL) 10 MG tablet, Take 1 tablet by mouth Daily., Disp: 30 tablet, Rfl: 5  •  nitroglycerin (NITROSTAT) 0.4 MG SL tablet, 1 under the tongue as needed for angina, may repeat q5mins for up three doses, Disp: 25 tablet, Rfl: 0  •  ondansetron ODT (Zofran ODT) 4 MG disintegrating tablet, Place 1 tablet on the tongue Every 8 (Eight) Hours As Needed for Nausea or Vomiting., Disp: 20 tablet, Rfl: 0      Objective   Vital Signs:   /74 (BP Location: Left arm, Patient Position: Sitting)   Pulse 64   Temp 96.9 °F (36.1 °C) (Infrared)   Ht 148.9 cm (58.63\")   Wt 53.5 kg (118 lb)   SpO2 100%   BMI 24.13 kg/m²       Physical exam  Constitutional: oriented to person, place, and time.  well-developed and well-nourished. No distress.   HENT:   Head: Normocephalic and atraumatic.   Eyes: Conjunctivae and EOM are normal.   Cardiovascular: Normal rate, regular rhythm and normal heart sounds.  Exam reveals no gallop and no friction rub.    No murmur heard.  Pulmonary/Chest: Effort normal and breath sounds normal. No respiratory distress.   no wheezes.   Abdomen: Soft mild epigastric tenderness, no rebound, no guarding  Neurological:  alert and oriented to person, place, and time.   Skin: Skin is warm and dry. not diaphoretic.   Psychiatric:  normal mood and " affect. behavior is normal. Judgment and thought content normal.      Result Review :   The following data was reviewed by: Lainey Mcmahon MD on 12/14/2022:  CMP    CMP 8/19/22   Glucose 74   BUN 10   Creatinine 0.71   Sodium 141   Potassium 4.3   Chloride 104   Calcium 9.4   Albumin 4.40   Total Bilirubin 0.2   Alkaline Phosphatase 73   AST (SGOT) 16   ALT (SGPT) 17           CBC    CBC 8/19/22   WBC 7.41   RBC 5.14   Hemoglobin 11.1 (A)   Hematocrit 37.5   MCV 73.0 (A)   MCH 21.6 (A)   MCHC 29.6 (A)   RDW 16.2 (A)   Platelets 329   (A) Abnormal value            Lipid Panel    Lipid Panel 8/19/22   Total Cholesterol 175   Triglycerides 81   HDL Cholesterol 71 (A)   VLDL Cholesterol 15   LDL Cholesterol  89   LDL/HDL Ratio 1.24   (A) Abnormal value            TSH    TSH 8/19/22   TSH 1.040           Lab Results   Component Value Date    MUQO46HC 35.2 08/16/2021    [id number] 485 08/16/2021     Data reviewed: Consultant notes ortho          Assessment and Plan    Diagnoses and all orders for this visit:    1. Hematemesis with nausea (Primary)-   I would like to get an endoscopy for further evaluation and we discussed this today as well as potentially serious causes of the bleeding such as ulcer or cancer but she declines to do an endoscopy.  She will take a 2-week course of over-the-counter Prilosec and avoid all NSAIDs.  She will go to the ER if she has any significant hematemesis    2. Primary hypertension-good control on lisinopril    3. Chest pain, unspecified type-has resolved and patient does not want to do further cardiac work-up at this time    4. Hypothyroidism (acquired)-recheck next year    Other orders  -     ondansetron ODT (Zofran ODT) 4 MG disintegrating tablet; Place 1 tablet on the tongue Every 8 (Eight) Hours As Needed for Nausea or Vomiting.  Dispense: 20 tablet; Refill: 0        Follow Up   Return in about 2 months (around 2/14/2023) for Next scheduled follow up.  Patient was given instructions  and counseling regarding her condition or for health maintenance advice. Please see specific information pulled into the AVS if appropriate.

## 2022-12-14 ENCOUNTER — OFFICE VISIT (OUTPATIENT)
Dept: INTERNAL MEDICINE | Facility: CLINIC | Age: 57
End: 2022-12-14

## 2022-12-14 VITALS
OXYGEN SATURATION: 100 % | WEIGHT: 118 LBS | HEART RATE: 64 BPM | DIASTOLIC BLOOD PRESSURE: 74 MMHG | BODY MASS INDEX: 23.79 KG/M2 | TEMPERATURE: 96.9 F | HEIGHT: 59 IN | SYSTOLIC BLOOD PRESSURE: 122 MMHG

## 2022-12-14 DIAGNOSIS — E03.9 HYPOTHYROIDISM (ACQUIRED): Chronic | ICD-10-CM

## 2022-12-14 DIAGNOSIS — I10 PRIMARY HYPERTENSION: Chronic | ICD-10-CM

## 2022-12-14 DIAGNOSIS — K92.0 HEMATEMESIS WITH NAUSEA: Primary | ICD-10-CM

## 2022-12-14 DIAGNOSIS — R07.9 CHEST PAIN, UNSPECIFIED TYPE: Chronic | ICD-10-CM

## 2022-12-14 PROCEDURE — 99214 OFFICE O/P EST MOD 30 MIN: CPT | Performed by: INTERNAL MEDICINE

## 2022-12-14 RX ORDER — ONDANSETRON 4 MG/1
4 TABLET, ORALLY DISINTEGRATING ORAL EVERY 8 HOURS PRN
Qty: 20 TABLET | Refills: 0 | Status: SHIPPED | OUTPATIENT
Start: 2022-12-14

## 2022-12-23 ENCOUNTER — HOSPITAL ENCOUNTER (OUTPATIENT)
Dept: MAMMOGRAPHY | Facility: HOSPITAL | Age: 57
End: 2022-12-23

## 2023-02-15 ENCOUNTER — OFFICE VISIT (OUTPATIENT)
Dept: INTERNAL MEDICINE | Facility: CLINIC | Age: 58
End: 2023-02-15
Payer: COMMERCIAL

## 2023-02-15 ENCOUNTER — LAB (OUTPATIENT)
Dept: INTERNAL MEDICINE | Facility: CLINIC | Age: 58
End: 2023-02-15
Payer: COMMERCIAL

## 2023-02-15 VITALS
HEART RATE: 79 BPM | OXYGEN SATURATION: 99 % | BODY MASS INDEX: 23.79 KG/M2 | SYSTOLIC BLOOD PRESSURE: 110 MMHG | HEIGHT: 59 IN | WEIGHT: 118 LBS | DIASTOLIC BLOOD PRESSURE: 72 MMHG | TEMPERATURE: 97.3 F

## 2023-02-15 DIAGNOSIS — K92.0 HEMATEMESIS WITH NAUSEA: ICD-10-CM

## 2023-02-15 DIAGNOSIS — I10 PRIMARY HYPERTENSION: Chronic | ICD-10-CM

## 2023-02-15 DIAGNOSIS — I10 PRIMARY HYPERTENSION: ICD-10-CM

## 2023-02-15 DIAGNOSIS — E03.9 HYPOTHYROIDISM (ACQUIRED): Chronic | ICD-10-CM

## 2023-02-15 DIAGNOSIS — M79.604 ANTERIOR LEG PAIN, RIGHT: Primary | ICD-10-CM

## 2023-02-15 PROCEDURE — 80050 GENERAL HEALTH PANEL: CPT | Performed by: INTERNAL MEDICINE

## 2023-02-15 PROCEDURE — 85007 BL SMEAR W/DIFF WBC COUNT: CPT | Performed by: INTERNAL MEDICINE

## 2023-02-15 PROCEDURE — 99214 OFFICE O/P EST MOD 30 MIN: CPT | Performed by: INTERNAL MEDICINE

## 2023-02-15 RX ORDER — CLONIDINE HYDROCHLORIDE 0.3 MG/1
0.3 TABLET ORAL 2 TIMES DAILY
COMMUNITY
Start: 2022-12-15

## 2023-02-15 NOTE — PROGRESS NOTES
"Answers for HPI/ROS submitted by the patient on 2/10/2023  Please describe your symptoms.: Leg pain / blood work needed  Have you had these symptoms before?: No  How long have you been having these symptoms?: Greater than 2 weeks  What is the primary reason for your visit?: Other    Chief Complaint  Vomiting Blood (F/u), Nausea (F/u), and Leg Pain (She has been having right leg pain up near the joint near her vaginal area.)    Subjective          Bhavani Coon presents to [facility]  History of Present Illness    The patient presents today for a follow-up on hypertension, hypothyroidism, and right leg pain.    Right leg pain  The patient complains of a \"knot\" and pain in her right groin area over the last 2 months.  She also feels like the right thigh is swollen.  She still has some pain in her back from when she had back surgery in is not sure if the pain in the groin is related to the back pain but she feels like this is different. She notices it the most when she is sitting. The pain is not as bad when she is up ambulating. She has discomfort all the time. The pain is sometimes sharp and other times it is dull. She can feel pain going down the side of her right leg occasionally.  She does take Tylenol as needed which seems to help    Hypertension-she is taking lisinopril regularly  The patient's blood pressure was normal while in the office today. She has not checked her blood pressure at home recently    Hypothyroidism  She is taking her thyroid medication as prescribed.  Weight is stable.  Bowel movements fairly regular    Episode of hematemesis in 11/22 associated with COVID infection with GI symptoms.  She denies any further episodes of hematemesis.  She has had some nausea and vomiting occasionally but Prilosec did seem to help.        Current Outpatient Medications:   •  acyclovir (ZOVIRAX) 400 MG tablet, TAKE ONE TABLET BY MOUTH THREE TIMES A DAY FOR 5 DAYS AS NEEDED FOR " "BREAKOUTS, THEN TAKE 1 TABLET BY MOUTH TWO TIMES A DAY BETWEEN BREAKOUTS, Disp: 90 tablet, Rfl: 11  •  aspirin (aspirin) 81 MG EC tablet, Take 1 tablet by mouth Daily., Disp: , Rfl:   •  buprenorphine-naloxone (SUBOXONE) 8-2 MG per SL tablet, Place 1 tablet under the tongue Daily., Disp: , Rfl:   •  buPROPion XL (WELLBUTRIN XL) 300 MG 24 hr tablet, Take 300 mg by mouth Daily., Disp: , Rfl:   •  cloNIDine (CATAPRES) 0.1 MG tablet, Take 0.1 mg by mouth 2 (Two) Times a Day As Needed for High Blood Pressure., Disp: , Rfl:   •  levothyroxine (SYNTHROID, LEVOTHROID) 50 MCG tablet, Take 1 tablet by mouth Daily., Disp: 30 tablet, Rfl: 5  •  lisinopril (PRINIVIL,ZESTRIL) 10 MG tablet, Take 1 tablet by mouth Daily., Disp: 30 tablet, Rfl: 5  •  nitroglycerin (NITROSTAT) 0.4 MG SL tablet, 1 under the tongue as needed for angina, may repeat q5mins for up three doses, Disp: 25 tablet, Rfl: 0  •  ondansetron ODT (Zofran ODT) 4 MG disintegrating tablet, Place 1 tablet on the tongue Every 8 (Eight) Hours As Needed for Nausea or Vomiting., Disp: 20 tablet, Rfl: 0  •  cloNIDine (CATAPRES) 0.3 MG tablet, 0.3 mg 2 (Two) Times a Day., Disp: , Rfl:    The following portions of the patient's history were reviewed and updated as appropriate: allergies, current medications, past family history, past medical history, past social history, past surgical history and problem list.     Objective   Vital Signs:   /72 (BP Location: Right arm, Patient Position: Sitting)   Pulse 79   Temp 97.3 °F (36.3 °C) (Infrared)   Ht 148.9 cm (58.62\")   Wt 53.5 kg (118 lb)   SpO2 99%   BMI 24.14 kg/m²       Physical exam  Constitutional: oriented to person, place, and time.  well-developed and well-nourished. No distress.   HENT: Nodule in left posterior neck still approximately 1.5cm   Head: Normocephalic and atraumatic.   Eyes: Conjunctivae and EOM are normal.   Cardiovascular: Normal rate, regular rhythm and normal heart sounds.  Exam reveals no " gallop and no friction rub.    No murmur heard.  Pulmonary/Chest: Effort normal and breath sounds normal. No respiratory distress.   no wheezes.   Abd: soft, NTND  MS: no hernias,  LNs or nodules appreciated in R groin. There is pain w/ R hip rotation. Neg SLR B  Neurological:  alert and oriented to person, place, and time.   PV: no LE edema or edema n R thigh appreciated  Skin: Skin is warm and dry. not diaphoretic.   Psychiatric:  normal mood and affect. behavior is normal. Judgment and thought content normal.      Result Review :   The following data was reviewed by: Lainey Mcmahon MD on 02/15/2023:  CMP    CMP 8/19/22   Glucose 74   BUN 10   Creatinine 0.71   eGFR 99.9   Sodium 141   Potassium 4.3   Chloride 104   Calcium 9.4   Total Protein 6.6   Albumin 4.40   Globulin 2.2   Total Bilirubin 0.2   Alkaline Phosphatase 73   AST (SGOT) 16   ALT (SGPT) 17   Albumin/Globulin Ratio 2.0   BUN/Creatinine Ratio 14.1   Anion Gap 12.2      Comments are available for some flowsheets but are not being displayed.           CBC    CBC 8/19/22   WBC 7.41   RBC 5.14   Hemoglobin 11.1 (A)   Hematocrit 37.5   MCV 73.0 (A)   MCH 21.6 (A)   MCHC 29.6 (A)   RDW 16.2 (A)   Platelets 329   (A) Abnormal value            Lipid Panel    Lipid Panel 8/19/22   Total Cholesterol 175   Triglycerides 81   HDL Cholesterol 71 (A)   VLDL Cholesterol 15   LDL Cholesterol  89   LDL/HDL Ratio 1.24   (A) Abnormal value            TSH    TSH 8/19/22   TSH 1.040           Lab Results   Component Value Date    AQIC21ZV 35.2 08/16/2021    [id number] 485 08/16/2021               Assessment and Plan    Diagnoses and all orders for this visit:    1. Anterior leg pain, right (Primary)  Comments:    - I do not feel any nodules or lymph nodes.  I think it may be from hip arthritis.- Discussed getting an x-ray of the hip to see if there was some osteoarthritis, but she declined.    if she continues to feel like there is something there, we could also get an  ultrasound. She declines getting this done currently. Will let me know if she changes her mind.   - Continue the Tylenol as needed for the discomfort.  - Will also try to avoid sitting for prolonged periods.     2. Primary hypertension  Comments:  - Well controlled.   - Will continue current medication.  - Check labs today.   Orders:  -     CBC & Differential; Future  -     Comprehensive Metabolic Panel; Future    3. Hypothyroidism (acquired)  Comments:  - Takes Synthroid.  - Will check TSH today.  Orders:  -     TSH; Future    4. Hematemesis with nausea- had just 1 episode when she was sick with COVID and no recurrence.  Discussed endoscopy again today but she continues to decline              Follow Up   No follow-ups on file.  Patient was given instructions and counseling regarding her condition or for health maintenance advice. Please see specific information pulled into the AVS if appropriate.     HM - her hailee is due - she will call to get rescheduled    Transcribed from ambient dictation for Lainey Mcmahon MD by Kathryn Mcclellan.  02/15/23   16:24 EST    Patient or patient representative verbalized consent to the visit recording.  I have personally performed the services described in this document as transcribed by the above individual, and it is both accurate and complete.

## 2023-02-16 LAB
ALBUMIN SERPL-MCNC: 4.4 G/DL (ref 3.5–5.2)
ALBUMIN/GLOB SERPL: 1.9 G/DL
ALP SERPL-CCNC: 72 U/L (ref 39–117)
ALT SERPL W P-5'-P-CCNC: 10 U/L (ref 1–33)
ANION GAP SERPL CALCULATED.3IONS-SCNC: 10.6 MMOL/L (ref 5–15)
ANISOCYTOSIS BLD QL: NORMAL
AST SERPL-CCNC: 12 U/L (ref 1–32)
BASOPHILS # BLD AUTO: 0.06 10*3/MM3 (ref 0–0.2)
BASOPHILS NFR BLD AUTO: 0.7 % (ref 0–1.5)
BILIRUB SERPL-MCNC: <0.2 MG/DL (ref 0–1.2)
BUN SERPL-MCNC: 11 MG/DL (ref 6–20)
BUN/CREAT SERPL: 13.8 (ref 7–25)
CALCIUM SPEC-SCNC: 9.2 MG/DL (ref 8.6–10.5)
CHLORIDE SERPL-SCNC: 103 MMOL/L (ref 98–107)
CO2 SERPL-SCNC: 29.4 MMOL/L (ref 22–29)
CREAT SERPL-MCNC: 0.8 MG/DL (ref 0.57–1)
DEPRECATED RDW RBC AUTO: 42 FL (ref 37–54)
EGFRCR SERPLBLD CKD-EPI 2021: 86.1 ML/MIN/1.73
EOSINOPHIL # BLD AUTO: 0.05 10*3/MM3 (ref 0–0.4)
EOSINOPHIL NFR BLD AUTO: 0.6 % (ref 0.3–6.2)
ERYTHROCYTE [DISTWIDTH] IN BLOOD BY AUTOMATED COUNT: 15.9 % (ref 12.3–15.4)
GLOBULIN UR ELPH-MCNC: 2.3 GM/DL
GLUCOSE SERPL-MCNC: 86 MG/DL (ref 65–99)
HCT VFR BLD AUTO: 35.1 % (ref 34–46.6)
HGB BLD-MCNC: 11.2 G/DL (ref 12–15.9)
IMM GRANULOCYTES # BLD AUTO: 0.03 10*3/MM3 (ref 0–0.05)
IMM GRANULOCYTES NFR BLD AUTO: 0.3 % (ref 0–0.5)
LYMPHOCYTES # BLD AUTO: 3.49 10*3/MM3 (ref 0.7–3.1)
LYMPHOCYTES NFR BLD AUTO: 39.4 % (ref 19.6–45.3)
MCH RBC QN AUTO: 23.5 PG (ref 26.6–33)
MCHC RBC AUTO-ENTMCNC: 31.9 G/DL (ref 31.5–35.7)
MCV RBC AUTO: 73.6 FL (ref 79–97)
MICROCYTES BLD QL: NORMAL
MONOCYTES # BLD AUTO: 0.62 10*3/MM3 (ref 0.1–0.9)
MONOCYTES NFR BLD AUTO: 7 % (ref 5–12)
NEUTROPHILS NFR BLD AUTO: 4.61 10*3/MM3 (ref 1.7–7)
NEUTROPHILS NFR BLD AUTO: 52 % (ref 42.7–76)
NRBC BLD AUTO-RTO: 0 /100 WBC (ref 0–0.2)
PLAT MORPH BLD: NORMAL
PLATELET # BLD AUTO: 339 10*3/MM3 (ref 140–450)
PMV BLD AUTO: 9.7 FL (ref 6–12)
POIKILOCYTOSIS BLD QL SMEAR: NORMAL
POTASSIUM SERPL-SCNC: 3.8 MMOL/L (ref 3.5–5.2)
PROT SERPL-MCNC: 6.7 G/DL (ref 6–8.5)
RBC # BLD AUTO: 4.77 10*6/MM3 (ref 3.77–5.28)
SODIUM SERPL-SCNC: 143 MMOL/L (ref 136–145)
TSH SERPL DL<=0.05 MIU/L-ACNC: 1.35 UIU/ML (ref 0.27–4.2)
WBC MORPH BLD: NORMAL
WBC NRBC COR # BLD: 8.86 10*3/MM3 (ref 3.4–10.8)

## 2023-04-09 DIAGNOSIS — I10 HYPERTENSION, UNSPECIFIED TYPE: ICD-10-CM

## 2023-04-11 RX ORDER — LISINOPRIL 10 MG/1
TABLET ORAL
Qty: 30 TABLET | Refills: 5 | Status: SHIPPED | OUTPATIENT
Start: 2023-04-11

## 2023-05-22 RX ORDER — ONDANSETRON 4 MG/1
TABLET, ORALLY DISINTEGRATING ORAL
Qty: 20 TABLET | Refills: 0 | Status: SHIPPED | OUTPATIENT
Start: 2023-05-22

## 2023-06-02 ENCOUNTER — HOSPITAL ENCOUNTER (OUTPATIENT)
Dept: MAMMOGRAPHY | Facility: HOSPITAL | Age: 58
Discharge: HOME | End: 2023-06-02
Admitting: INTERNAL MEDICINE

## 2023-06-02 DIAGNOSIS — Z12.31 VISIT FOR SCREENING MAMMOGRAM: ICD-10-CM

## 2023-06-02 PROCEDURE — 77067 SCR MAMMO BI INCL CAD: CPT

## 2023-06-02 PROCEDURE — 77063 BREAST TOMOSYNTHESIS BI: CPT

## 2023-06-03 DIAGNOSIS — E03.9 HYPOTHYROIDISM (ACQUIRED): ICD-10-CM

## 2023-06-05 RX ORDER — LEVOTHYROXINE SODIUM 0.05 MG/1
TABLET ORAL
Qty: 30 TABLET | Refills: 5 | Status: SHIPPED | OUTPATIENT
Start: 2023-06-05

## 2023-06-14 ENCOUNTER — OFFICE VISIT (OUTPATIENT)
Dept: INTERNAL MEDICINE | Facility: CLINIC | Age: 58
End: 2023-06-14
Payer: COMMERCIAL

## 2023-06-14 VITALS
OXYGEN SATURATION: 99 % | SYSTOLIC BLOOD PRESSURE: 108 MMHG | TEMPERATURE: 97.8 F | HEIGHT: 59 IN | BODY MASS INDEX: 24.8 KG/M2 | HEART RATE: 76 BPM | WEIGHT: 123 LBS | DIASTOLIC BLOOD PRESSURE: 70 MMHG

## 2023-06-14 DIAGNOSIS — R19.4 CHANGE IN BOWEL HABIT: Primary | ICD-10-CM

## 2023-06-14 PROCEDURE — 99213 OFFICE O/P EST LOW 20 MIN: CPT | Performed by: INTERNAL MEDICINE

## 2023-06-14 NOTE — PROGRESS NOTES
Answers submitted by the patient for this visit:  Other (Submitted on 6/13/2023)  Please describe your symptoms.: Needing my stool tested.  Have you had these symptoms before?: No  How long have you been having these symptoms?: Greater than 2 weeks  Please list any medications you are currently taking for this condition.: None  Primary Reason for Visit (Submitted on 6/13/2023)  What is the primary reason for your visit?: Other  Chief Complaint  Diarrhea (She has had loose stool and one time thought she saw a bug in it.  Sx x 1 month.)    Subjective          Bhavani Coon presents to [facility]  History of Present Illness      Change in bowel habits - she has noticed more lose stool intermittently. She finds if she eats corn, she will get very loose stool. She saw something that looked like an insect in her bowel movement 3 months ago and she has been real anxious about it and cannot get it off her mind so wanted a stool test for parasites done. No blood in her stool  She will use the otc prilosec as needed every few weeks which seems to help control heart burn and stomach upset.   She does have dogs but they have not had any trouble w/ worms        Current Outpatient Medications:   •  acyclovir (ZOVIRAX) 400 MG tablet, TAKE ONE TABLET BY MOUTH THREE TIMES A DAY FOR 5 DAYS AS NEEDED FOR BREAKOUTS, THEN TAKE 1 TABLET BY MOUTH TWO TIMES A DAY BETWEEN BREAKOUTS, Disp: 90 tablet, Rfl: 11  •  buprenorphine-naloxone (SUBOXONE) 8-2 MG per SL tablet, Place 1 tablet under the tongue Daily., Disp: , Rfl:   •  buPROPion XL (WELLBUTRIN XL) 300 MG 24 hr tablet, Take 1 tablet by mouth Daily., Disp: , Rfl:   •  cloNIDine (CATAPRES) 0.3 MG tablet, 1 tablet 2 (Two) Times a Day., Disp: , Rfl:   •  levothyroxine (SYNTHROID, LEVOTHROID) 50 MCG tablet, TAKE ONE TABLET BY MOUTH DAILY, Disp: 30 tablet, Rfl: 5  •  lisinopril (PRINIVIL,ZESTRIL) 10 MG tablet, TAKE ONE TABLET BY MOUTH DAILY, Disp: 30 tablet,  "Rfl: 5  •  nitroglycerin (NITROSTAT) 0.4 MG SL tablet, 1 under the tongue as needed for angina, may repeat q5mins for up three doses, Disp: 25 tablet, Rfl: 0  •  ondansetron ODT (ZOFRAN-ODT) 4 MG disintegrating tablet, DISSOLVE ONE TABLET BY MOUTH EVERY 8 HOURS AS NEEDED FOR NAUSEA OR VOMITING, Disp: 20 tablet, Rfl: 0  •  aspirin (aspirin) 81 MG EC tablet, Take 1 tablet by mouth Daily. (Patient not taking: Reported on 6/14/2023), Disp: , Rfl:    The following portions of the patient's history were reviewed and updated as appropriate: allergies, current medications, past family history, past medical history, past social history, past surgical history and problem list.     Objective   Vital Signs:   /70 (BP Location: Right arm, Patient Position: Sitting)   Pulse 76   Temp 97.8 °F (36.6 °C) (Infrared)   Ht 148.9 cm (58.62\")   Wt 55.8 kg (123 lb)   SpO2 99%   BMI 25.17 kg/m²       Physical exam  Constitutional: oriented to person, place, and time.  well-developed and well-nourished. No distress.   HENT:   Head: Normocephalic and atraumatic.   Eyes: Conjunctivae and EOM are normal.   Cardiovascular: Normal rate, regular rhythm and normal heart sounds.  Exam reveals no gallop and no friction rub.    No murmur heard.  Pulmonary/Chest: Effort normal and breath sounds normal. No respiratory distress.   no wheezes.   Neurological:  alert and oriented to person, place, and time.   Abdomen: Soft, nontender nondistended  Skin: Skin is warm and dry. not diaphoretic.   Psychiatric:  normal mood and affect. behavior is normal. Judgment and thought content normal.      Result Review :                   Assessment and Plan    Diagnoses and all orders for this visit:    1. Change in bowel habit (Primary)  -     Ova & Parasite Examination - Stool, Per Rectum; Future        Follow Up   No follow-ups on file.  Patient was given instructions and counseling regarding her condition or for health maintenance advice. Please see " specific information pulled into the AVS if appropriate.

## 2023-06-16 ENCOUNTER — LAB (OUTPATIENT)
Dept: LAB | Facility: HOSPITAL | Age: 58
End: 2023-06-16
Payer: COMMERCIAL

## 2023-06-16 DIAGNOSIS — R19.4 CHANGE IN BOWEL HABIT: ICD-10-CM

## 2023-06-16 PROCEDURE — 87209 SMEAR COMPLEX STAIN: CPT

## 2023-06-16 PROCEDURE — 87177 OVA AND PARASITES SMEARS: CPT

## 2023-06-21 LAB
O+P SPEC MICRO: NORMAL
O+P STL TRI STN: NORMAL

## 2023-08-13 DIAGNOSIS — B00.9 HERPES SIMPLEX: ICD-10-CM

## 2023-08-14 RX ORDER — ACYCLOVIR 400 MG/1
TABLET ORAL
Qty: 90 TABLET | Refills: 0 | Status: SHIPPED | OUTPATIENT
Start: 2023-08-14

## 2023-09-26 DIAGNOSIS — B00.9 HERPES SIMPLEX: ICD-10-CM

## 2023-09-26 RX ORDER — ACYCLOVIR 400 MG/1
TABLET ORAL
Qty: 90 TABLET | Refills: 0 | Status: SHIPPED | OUTPATIENT
Start: 2023-09-26

## 2023-10-17 NOTE — PROGRESS NOTES
Here for physical    Diet:  healthy overall;   does a daily MVI  Caffeine- 1-2 per day, drinks lots of water. ETOH:none.   Exercise: walking her dogs daily and does have some weights that she has been doing     she had LEEP in '95, and had a supracervical hysterectomy and USO in '09. She has had hot flashes for a few years, but worse recently. Day and night. Her mother and grandmother both had breast cancer. Pap normal last year     Fever blister-she is on acyclovir bid and will take more when she gets a fever blister or a genital herpes outbreak.       Hypothyroid - she is on synthroid 50. Has lost weight intentionally. BM have been regular     HTN - she is on  Lisinopril. BPs have been good generally when she has checked. Has not felt lightheaded     Opioid addiction - she is on suboxone through Dr Cowart and is trying to wean.     Anxiety - she sees Aditya Polanco and is on wellbutirn and clonidine and doing well.     B shoulder pain - she sees ortho and gets cortisone injections periodically. She received shots today. She goes to [facility]    R Hip pain- worse in recent months; has been worse recently w/ more walking. Doesn't lay on her R side because of her shoulder. No pain getting up from chair. She takes tylenol or aleve as needed for her hip. Her ortho just prescribed diclofenac recently about a week ago, has taken a few days    She has been working from home because of concern of COVID and needs a letter for work supporting this        Current Outpatient Medications:     acyclovir (ZOVIRAX) 400 MG tablet, TAKE ONE TABLET BY MOUTH THREE TIMES A DAY FOR 5 DAYS AS NEEDED FOR OUTBREAKS AND THEN TAKE 1 TABLET BY MOUTH TWICE A DAY BETWEEN BREAKOUTS, Disp: 90 tablet, Rfl: 0    buprenorphine-naloxone (SUBOXONE) 8-2 MG per SL tablet, Place 1 tablet under the tongue Daily., Disp: , Rfl:     buPROPion XL (WELLBUTRIN XL) 300 MG 24 hr tablet, Take 1 tablet by mouth Daily., Disp: , Rfl:     cloNIDine (CATAPRES) 0.3 MG  "tablet, 1 tablet 2 (Two) Times a Day., Disp: , Rfl:     levothyroxine (SYNTHROID, LEVOTHROID) 50 MCG tablet, TAKE ONE TABLET BY MOUTH DAILY, Disp: 30 tablet, Rfl: 5    lisinopril (PRINIVIL,ZESTRIL) 10 MG tablet, TAKE ONE TABLET BY MOUTH DAILY, Disp: 30 tablet, Rfl: 5    nitroglycerin (NITROSTAT) 0.4 MG SL tablet, 1 under the tongue as needed for angina, may repeat q5mins for up three doses, Disp: 25 tablet, Rfl: 0    ondansetron ODT (ZOFRAN-ODT) 4 MG disintegrating tablet, DISSOLVE ONE TABLET BY MOUTH EVERY 8 HOURS AS NEEDED FOR NAUSEA OR VOMITING, Disp: 20 tablet, Rfl: 0    The following portions of the patient's history were reviewed and updated as appropriate: allergies, current medications, past family history, past medical history, past social history, past surgical history and problem list.    Review of Systems   Constitutional:  Positive for unexpected weight loss (has lost weight intentionally). Negative for activity change, appetite change, fever and unexpected weight gain.   HENT: Negative.     Eyes: Negative.    Respiratory:  Negative for shortness of breath and wheezing.    Cardiovascular:  Negative for chest pain, palpitations and leg swelling.   Gastrointestinal: Negative.    Endocrine: Negative.    Genitourinary:  Negative for difficulty urinating and dysuria.   Musculoskeletal:  Positive for arthralgias (R hip, B shoulders).   Skin: Negative.    Allergic/Immunologic: Negative for immunocompromised state.   Neurological:  Negative for seizures, speech difficulty, memory problem and confusion.   Hematological:  Does not bruise/bleed easily.   Psychiatric/Behavioral:  Negative for agitation.          Objective    /76   Pulse 88   Temp 97.7 °F (36.5 °C) (Infrared)   Resp 16   Ht 149.2 cm (58.75\")   Wt 52.4 kg (115 lb 9.6 oz)   LMP  (LMP Unknown)   SpO2 99%   BMI 23.55 kg/m²   Physical Exam   Physical Exam  Vitals and nursing note reviewed.   Constitutional:       General: She is not in " acute distress.     Appearance: She is well-developed. She is not diaphoretic.   HENT:      Head: Normocephalic and atraumatic.      Right Ear: External ear normal.      Left Ear: External ear normal.      Nose: Nose normal.      Mouth/Throat:      Pharynx: No oropharyngeal exudate.   Eyes:      General: No scleral icterus.        Right eye: No discharge.         Left eye: No discharge.      Conjunctiva/sclera: Conjunctivae normal.      Pupils: Pupils are equal, round, and reactive to light.   Neck:      Thyroid: No thyromegaly.   Cardiovascular:      Rate and Rhythm: Normal rate and regular rhythm.      Heart sounds: Normal heart sounds. No murmur heard.     No friction rub. No gallop.   Pulmonary:      Effort: Pulmonary effort is normal. No respiratory distress.      Breath sounds: Normal breath sounds. No wheezing or rales.   Chest:   Breasts:     Right: No mass, nipple discharge, skin change or tenderness.      Left: No mass, nipple discharge, skin change or tenderness.   Abdominal:      General: Bowel sounds are normal. There is no distension.      Palpations: Abdomen is soft. There is no mass.      Tenderness: There is no abdominal tenderness. There is no guarding or rebound.   Musculoskeletal:         General: No deformity.      Cervical back: Normal range of motion and neck supple.      Comments: Slightly decreased R hip rotation   Lymphadenopathy:      Cervical: No cervical adenopathy.   Skin:     General: Skin is warm and dry.      Coloration: Skin is not pale.      Findings: No erythema or rash.   Neurological:      Mental Status: She is alert and oriented to person, place, and time.      Coordination: Coordination normal.      Deep Tendon Reflexes: Reflexes normal.   Psychiatric:         Behavior: Behavior normal.         Thought Content: Thought content normal.         Judgment: Judgment normal.           Assessment/Plan   Diagnoses and all orders for this visit:    1. Routine general medical  examination at a health care facility (Primary)  -     CBC (No Diff); Future  -     TSH Rfx On Abnormal To Free T4; Future  -     Lipid Panel; Future  -     Comprehensive Metabolic Panel; Future  -     POC Urinalysis Dipstick, Automated  Regular exercise/healthy diet. BSE q month. Sunscreen use encouraged. Check fasting labs  Mary Alice due 6/24  Colon due 4/29 (Dr Vazquez)  DT due now  DEXA - her ortho has ordered  Shingles- shingrix discussed - will return for it  Flu shot- due now  covid -due now, will get at pharmacy since we do not have  Pap due in '25 -  does still have cervix  She just had B [place] injections, so we will have her return for her flu, shingrix, and tetanus vaccinations    2. Vitamin D deficiency- check today  -     Vitamin D,25-Hydroxy; Future    3. Right hip pain- probably OA - we discussed getting xr. She wants to wait and do if pain worsens

## 2023-10-18 ENCOUNTER — LAB (OUTPATIENT)
Dept: INTERNAL MEDICINE | Facility: CLINIC | Age: 58
End: 2023-10-18
Payer: COMMERCIAL

## 2023-10-18 ENCOUNTER — OFFICE VISIT (OUTPATIENT)
Dept: INTERNAL MEDICINE | Facility: CLINIC | Age: 58
End: 2023-10-18
Payer: COMMERCIAL

## 2023-10-18 VITALS
OXYGEN SATURATION: 99 % | SYSTOLIC BLOOD PRESSURE: 102 MMHG | RESPIRATION RATE: 16 BRPM | DIASTOLIC BLOOD PRESSURE: 76 MMHG | TEMPERATURE: 97.7 F | WEIGHT: 115.6 LBS | HEART RATE: 88 BPM | BODY MASS INDEX: 23.31 KG/M2 | HEIGHT: 59 IN

## 2023-10-18 DIAGNOSIS — M25.551 RIGHT HIP PAIN: ICD-10-CM

## 2023-10-18 DIAGNOSIS — Z00.00 ROUTINE GENERAL MEDICAL EXAMINATION AT A HEALTH CARE FACILITY: Primary | ICD-10-CM

## 2023-10-18 DIAGNOSIS — E55.9 VITAMIN D DEFICIENCY: ICD-10-CM

## 2023-10-18 DIAGNOSIS — Z00.00 ROUTINE GENERAL MEDICAL EXAMINATION AT A HEALTH CARE FACILITY: ICD-10-CM

## 2023-10-18 LAB
25(OH)D3 SERPL-MCNC: 34.6 NG/ML (ref 30–100)
ALBUMIN SERPL-MCNC: 4.7 G/DL (ref 3.5–5.2)
ALBUMIN/GLOB SERPL: 2 G/DL
ALP SERPL-CCNC: 77 U/L (ref 39–117)
ALT SERPL W P-5'-P-CCNC: 9 U/L (ref 1–33)
ANION GAP SERPL CALCULATED.3IONS-SCNC: 13.6 MMOL/L (ref 5–15)
AST SERPL-CCNC: 16 U/L (ref 1–32)
BILIRUB BLD-MCNC: NEGATIVE MG/DL
BILIRUB SERPL-MCNC: 0.2 MG/DL (ref 0–1.2)
BUN SERPL-MCNC: 10 MG/DL (ref 6–20)
BUN/CREAT SERPL: 12.3 (ref 7–25)
CALCIUM SPEC-SCNC: 9.6 MG/DL (ref 8.6–10.5)
CHLORIDE SERPL-SCNC: 103 MMOL/L (ref 98–107)
CHOLEST SERPL-MCNC: 183 MG/DL (ref 0–200)
CLARITY, POC: CLEAR
CO2 SERPL-SCNC: 22.4 MMOL/L (ref 22–29)
COLOR UR: YELLOW
CREAT SERPL-MCNC: 0.81 MG/DL (ref 0.57–1)
DEPRECATED RDW RBC AUTO: 41.5 FL (ref 37–54)
EGFRCR SERPLBLD CKD-EPI 2021: 84.8 ML/MIN/1.73
ERYTHROCYTE [DISTWIDTH] IN BLOOD BY AUTOMATED COUNT: 16.2 % (ref 12.3–15.4)
EXPIRATION DATE: NORMAL
GLOBULIN UR ELPH-MCNC: 2.4 GM/DL
GLUCOSE SERPL-MCNC: 99 MG/DL (ref 65–99)
GLUCOSE UR STRIP-MCNC: NEGATIVE MG/DL
HCT VFR BLD AUTO: 37 % (ref 34–46.6)
HDLC SERPL-MCNC: 77 MG/DL (ref 40–60)
HGB BLD-MCNC: 11.6 G/DL (ref 12–15.9)
KETONES UR QL: NEGATIVE
LDLC SERPL CALC-MCNC: 94 MG/DL (ref 0–100)
LDLC/HDLC SERPL: 1.21 {RATIO}
LEUKOCYTE EST, POC: NEGATIVE
Lab: NORMAL
MCH RBC QN AUTO: 22.9 PG (ref 26.6–33)
MCHC RBC AUTO-ENTMCNC: 31.4 G/DL (ref 31.5–35.7)
MCV RBC AUTO: 73.1 FL (ref 79–97)
NITRITE UR-MCNC: NEGATIVE MG/ML
PH UR: 6 [PH] (ref 5–8)
PLATELET # BLD AUTO: 404 10*3/MM3 (ref 140–450)
PMV BLD AUTO: 9.6 FL (ref 6–12)
POTASSIUM SERPL-SCNC: 4.5 MMOL/L (ref 3.5–5.2)
PROT SERPL-MCNC: 7.1 G/DL (ref 6–8.5)
PROT UR STRIP-MCNC: NEGATIVE MG/DL
RBC # BLD AUTO: 5.06 10*6/MM3 (ref 3.77–5.28)
RBC # UR STRIP: NEGATIVE /UL
SODIUM SERPL-SCNC: 139 MMOL/L (ref 136–145)
SP GR UR: 1 (ref 1–1.03)
TRIGL SERPL-MCNC: 65 MG/DL (ref 0–150)
TSH SERPL DL<=0.05 MIU/L-ACNC: 1 UIU/ML (ref 0.27–4.2)
UROBILINOGEN UR QL: NORMAL
VLDLC SERPL-MCNC: 12 MG/DL (ref 5–40)
WBC NRBC COR # BLD: 11.51 10*3/MM3 (ref 3.4–10.8)

## 2023-10-18 PROCEDURE — 36415 COLL VENOUS BLD VENIPUNCTURE: CPT | Performed by: INTERNAL MEDICINE

## 2023-10-18 PROCEDURE — 82306 VITAMIN D 25 HYDROXY: CPT | Performed by: INTERNAL MEDICINE

## 2023-10-18 PROCEDURE — 80061 LIPID PANEL: CPT | Performed by: INTERNAL MEDICINE

## 2023-10-18 PROCEDURE — 80050 GENERAL HEALTH PANEL: CPT | Performed by: INTERNAL MEDICINE

## 2023-10-18 PROCEDURE — 99396 PREV VISIT EST AGE 40-64: CPT | Performed by: INTERNAL MEDICINE

## 2023-10-18 PROCEDURE — 81003 URINALYSIS AUTO W/O SCOPE: CPT | Performed by: INTERNAL MEDICINE

## 2023-11-04 DIAGNOSIS — I10 HYPERTENSION, UNSPECIFIED TYPE: ICD-10-CM

## 2023-11-06 RX ORDER — LISINOPRIL 10 MG/1
TABLET ORAL
Qty: 90 TABLET | Refills: 1 | Status: SHIPPED | OUTPATIENT
Start: 2023-11-06

## 2023-11-12 DIAGNOSIS — B00.9 HERPES SIMPLEX: ICD-10-CM

## 2023-11-13 RX ORDER — ACYCLOVIR 400 MG/1
TABLET ORAL
Qty: 90 TABLET | Refills: 0 | Status: SHIPPED | OUTPATIENT
Start: 2023-11-13

## 2023-11-30 DIAGNOSIS — E03.9 HYPOTHYROIDISM (ACQUIRED): ICD-10-CM

## 2023-11-30 RX ORDER — LEVOTHYROXINE SODIUM 0.05 MG/1
50 TABLET ORAL DAILY
Qty: 90 TABLET | Refills: 1 | Status: SHIPPED | OUTPATIENT
Start: 2023-11-30

## 2023-12-26 DIAGNOSIS — B00.9 HERPES SIMPLEX: ICD-10-CM

## 2023-12-26 RX ORDER — ACYCLOVIR 400 MG/1
TABLET ORAL
Qty: 90 TABLET | Refills: 11 | Status: SHIPPED | OUTPATIENT
Start: 2023-12-26

## 2024-02-01 ENCOUNTER — TELEPHONE (OUTPATIENT)
Dept: INTERNAL MEDICINE | Facility: CLINIC | Age: 59
End: 2024-02-01
Payer: COMMERCIAL

## 2024-02-01 NOTE — TELEPHONE ENCOUNTER
----- Message from Lainey Mcmahon MD sent at 2/1/2024 12:43 PM EST -----  Regarding: MRI shoulder  She had an MRI of her shoulder done at [facility]-can request a copy of this?  Thanks

## 2024-02-01 NOTE — TELEPHONE ENCOUNTER
I called [facility] this morning and spoke with Valeri and she was faxing it over.

## 2024-03-15 ENCOUNTER — TELEPHONE (OUTPATIENT)
Dept: INTERNAL MEDICINE | Facility: CLINIC | Age: 59
End: 2024-03-15
Payer: COMMERCIAL

## 2024-03-15 NOTE — TELEPHONE ENCOUNTER
----- Message from Lainey Mcmahon MD sent at 3/15/2024  1:06 PM EDT -----  Regarding: surgery clearance  We received a form for preop clearance for her surgery for her shoulder.  If she can schedule a preop exam for this clearance

## 2024-03-15 NOTE — TELEPHONE ENCOUNTER
She will be in on 5/3/24 for an appointment and would like to do the clearance then.  Dr. Mcmahon said this would be okay.

## 2024-05-01 DIAGNOSIS — I10 HYPERTENSION, UNSPECIFIED TYPE: ICD-10-CM

## 2024-05-02 RX ORDER — LISINOPRIL 10 MG/1
10 TABLET ORAL DAILY
Qty: 90 TABLET | Refills: 0 | Status: SHIPPED | OUTPATIENT
Start: 2024-05-02

## 2024-05-02 NOTE — TELEPHONE ENCOUNTER
Rx Refill Note  Requested Prescriptions     Pending Prescriptions Disp Refills    lisinopril (PRINIVIL,ZESTRIL) 10 MG tablet [Pharmacy Med Name: LISINOPRIL 10 MG TABLET] 90 tablet 0     Sig: TAKE 1 TABLET BY MOUTH DAILY      Last office visit with prescribing clinician: 10/18/2023     Next office visit with prescribing clinician: 5/3/2024       Rocio Mata  05/02/24, 12:52 EDT

## 2024-05-03 ENCOUNTER — LAB (OUTPATIENT)
Dept: INTERNAL MEDICINE | Facility: CLINIC | Age: 59
End: 2024-05-03
Payer: COMMERCIAL

## 2024-05-03 ENCOUNTER — OFFICE VISIT (OUTPATIENT)
Dept: INTERNAL MEDICINE | Facility: CLINIC | Age: 59
End: 2024-05-03
Payer: COMMERCIAL

## 2024-05-03 VITALS
DIASTOLIC BLOOD PRESSURE: 62 MMHG | BODY MASS INDEX: 23.43 KG/M2 | HEART RATE: 79 BPM | SYSTOLIC BLOOD PRESSURE: 104 MMHG | TEMPERATURE: 98.2 F | RESPIRATION RATE: 18 BRPM | HEIGHT: 59 IN | OXYGEN SATURATION: 100 % | WEIGHT: 116.2 LBS

## 2024-05-03 DIAGNOSIS — D56.0 ALPHA THALASSEMIA: ICD-10-CM

## 2024-05-03 DIAGNOSIS — I10 PRIMARY HYPERTENSION: Chronic | ICD-10-CM

## 2024-05-03 DIAGNOSIS — M12.9 ARTHRITIS, MULTIPLE JOINT INVOLVEMENT: ICD-10-CM

## 2024-05-03 DIAGNOSIS — Z23 IMMUNIZATION DUE: ICD-10-CM

## 2024-05-03 DIAGNOSIS — E03.9 HYPOTHYROIDISM (ACQUIRED): ICD-10-CM

## 2024-05-03 DIAGNOSIS — E03.9 HYPOTHYROIDISM (ACQUIRED): Primary | ICD-10-CM

## 2024-05-03 LAB
ALBUMIN SERPL-MCNC: 4.8 G/DL (ref 3.5–5.2)
ALBUMIN/GLOB SERPL: 2.1 G/DL
ALP SERPL-CCNC: 72 U/L (ref 39–117)
ALT SERPL W P-5'-P-CCNC: 10 U/L (ref 1–33)
ANION GAP SERPL CALCULATED.3IONS-SCNC: 11 MMOL/L (ref 5–15)
AST SERPL-CCNC: 13 U/L (ref 1–32)
BASOPHILS # BLD AUTO: 0.03 10*3/MM3 (ref 0–0.2)
BASOPHILS NFR BLD AUTO: 0.4 % (ref 0–1.5)
BILIRUB SERPL-MCNC: 0.2 MG/DL (ref 0–1.2)
BUN SERPL-MCNC: 7 MG/DL (ref 6–20)
BUN/CREAT SERPL: 8.3 (ref 7–25)
CALCIUM SPEC-SCNC: 9.4 MG/DL (ref 8.6–10.5)
CHLORIDE SERPL-SCNC: 104 MMOL/L (ref 98–107)
CHOLEST SERPL-MCNC: 148 MG/DL (ref 0–200)
CHROMATIN AB SERPL-ACNC: <10 IU/ML (ref 0–14)
CO2 SERPL-SCNC: 23 MMOL/L (ref 22–29)
CREAT SERPL-MCNC: 0.84 MG/DL (ref 0.57–1)
CRP SERPL-MCNC: <0.3 MG/DL (ref 0–0.5)
DEPRECATED RDW RBC AUTO: 44.6 FL (ref 37–54)
EGFRCR SERPLBLD CKD-EPI 2021: 80.7 ML/MIN/1.73
EOSINOPHIL # BLD AUTO: 0.05 10*3/MM3 (ref 0–0.4)
EOSINOPHIL NFR BLD AUTO: 0.7 % (ref 0.3–6.2)
ERYTHROCYTE [DISTWIDTH] IN BLOOD BY AUTOMATED COUNT: 16.9 % (ref 12.3–15.4)
ERYTHROCYTE [SEDIMENTATION RATE] IN BLOOD: 6 MM/HR (ref 0–30)
GLOBULIN UR ELPH-MCNC: 2.3 GM/DL
GLUCOSE SERPL-MCNC: 77 MG/DL (ref 65–99)
HCT VFR BLD AUTO: 37.9 % (ref 34–46.6)
HDLC SERPL-MCNC: 65 MG/DL (ref 40–60)
HGB BLD-MCNC: 11.6 G/DL (ref 12–15.9)
LDLC SERPL CALC-MCNC: 67 MG/DL (ref 0–100)
LDLC/HDLC SERPL: 1.02 {RATIO}
LYMPHOCYTES # BLD AUTO: 3.84 10*3/MM3 (ref 0.7–3.1)
LYMPHOCYTES NFR BLD AUTO: 52.4 % (ref 19.6–45.3)
MCH RBC QN AUTO: 22.6 PG (ref 26.6–33)
MCHC RBC AUTO-ENTMCNC: 30.6 G/DL (ref 31.5–35.7)
MCV RBC AUTO: 73.9 FL (ref 79–97)
MONOCYTES # BLD AUTO: 0.45 10*3/MM3 (ref 0.1–0.9)
MONOCYTES NFR BLD AUTO: 6.1 % (ref 5–12)
NEUTROPHILS NFR BLD AUTO: 2.94 10*3/MM3 (ref 1.7–7)
NEUTROPHILS NFR BLD AUTO: 40.1 % (ref 42.7–76)
PLATELET # BLD AUTO: 373 10*3/MM3 (ref 140–450)
PMV BLD AUTO: 10.5 FL (ref 6–12)
POTASSIUM SERPL-SCNC: 3.8 MMOL/L (ref 3.5–5.2)
PROT SERPL-MCNC: 7.1 G/DL (ref 6–8.5)
RBC # BLD AUTO: 5.13 10*6/MM3 (ref 3.77–5.28)
SODIUM SERPL-SCNC: 138 MMOL/L (ref 136–145)
TRIGL SERPL-MCNC: 82 MG/DL (ref 0–150)
TSH SERPL DL<=0.05 MIU/L-ACNC: 1.56 UIU/ML (ref 0.27–4.2)
VLDLC SERPL-MCNC: 16 MG/DL (ref 5–40)
WBC NRBC COR # BLD AUTO: 7.33 10*3/MM3 (ref 3.4–10.8)

## 2024-05-03 PROCEDURE — 86140 C-REACTIVE PROTEIN: CPT | Performed by: INTERNAL MEDICINE

## 2024-05-03 PROCEDURE — 80061 LIPID PANEL: CPT | Performed by: INTERNAL MEDICINE

## 2024-05-03 PROCEDURE — 86431 RHEUMATOID FACTOR QUANT: CPT | Performed by: INTERNAL MEDICINE

## 2024-05-03 PROCEDURE — 36415 COLL VENOUS BLD VENIPUNCTURE: CPT | Performed by: INTERNAL MEDICINE

## 2024-05-03 PROCEDURE — 85652 RBC SED RATE AUTOMATED: CPT | Performed by: INTERNAL MEDICINE

## 2024-05-03 PROCEDURE — 80050 GENERAL HEALTH PANEL: CPT | Performed by: INTERNAL MEDICINE

## 2024-05-03 PROCEDURE — 86038 ANTINUCLEAR ANTIBODIES: CPT | Performed by: INTERNAL MEDICINE

## 2024-05-03 NOTE — PROGRESS NOTES
Answers submitted by the patient for this visit:  Other (Submitted on 5/2/2024)  Please describe your symptoms.: Blood work and arthritis issues  Have you had these symptoms before?: No  How long have you been having these symptoms?: Greater than 2 weeks  Primary Reason for Visit (Submitted on 5/2/2024)  What is the primary reason for your visit?: Other    Chief Complaint  Hypertension (6 month recheck ) and Hypothyroidism (6 month recheck )    Subjective          Bhavani Coon presents to [facility]  History of Present Illness    Shoulder pain on R - she needs a shoulder replacement but wants to wait until next year as she just got a new position w/ her work, so doesn't want to take off so soon.  She sees Dr. do at [facility]    Arthritis - she has pain in both shoulders, right hip pain    Patient is on Seroquel through her behavioral health provider and she has been on this for several years.  The behavioral health provider gave her an order for an FLP and CMP and patient would like to do today.  She did have coffee with creamer in the morning but has not had any food     Htn - she is on lisinopril. She  hasn't checked recently    Will occasionally get a fast heart rate alert on her watch-generally in the low 100 range like 104.  She is on clonidine twice daily but states sometimes she only takes it in the morning.  She is not sure if the fast heart rate are on the days that she has not taken the evening clonidine dose.  Caffeine intake-2 coffees a day  No sudafed    Hypothyroid-patient is on levothyroxine 50      Current Outpatient Medications:     acyclovir (ZOVIRAX) 400 MG tablet, TAKE 1 TABLET BY MOUTH THREE TIMES A DAY FOR 5 DAYS AS NEEDED FOR OUTBREAKS AND THEN TAKE 1 TABLET BY MOUTH TWO TIMES A DAY BETWEEN BREAKOUTS, Disp: 90 tablet, Rfl: 11    buPROPion XL (WELLBUTRIN XL) 300 MG 24 hr tablet, Take 1 tablet by mouth Daily., Disp: , Rfl:     cloNIDine (CATAPRES)  "0.3 MG tablet, 1 tablet 2 (Two) Times a Day., Disp: , Rfl:     levothyroxine (SYNTHROID, LEVOTHROID) 50 MCG tablet, TAKE 1 TABLET BY MOUTH DAILY, Disp: 90 tablet, Rfl: 1    lisinopril (PRINIVIL,ZESTRIL) 10 MG tablet, TAKE 1 TABLET BY MOUTH DAILY, Disp: 90 tablet, Rfl: 0    ondansetron ODT (ZOFRAN-ODT) 4 MG disintegrating tablet, DISSOLVE ONE TABLET BY MOUTH EVERY 8 HOURS AS NEEDED FOR NAUSEA OR VOMITING, Disp: 20 tablet, Rfl: 0   The following portions of the patient's history were reviewed and updated as appropriate: allergies, current medications, past family history, past medical history, past social history, past surgical history and problem list.     Objective   Vital Signs:   /62   Pulse 79   Temp 98.2 °F (36.8 °C) (Infrared)   Resp 18   Ht 149.2 cm (58.74\")   Wt 52.7 kg (116 lb 3.2 oz)   SpO2 100%   BMI 23.68 kg/m²       Physical exam  Constitutional: oriented to person, place, and time.  well-developed and well-nourished. No distress.   HENT:   Head: Normocephalic and atraumatic.   Eyes: Conjunctivae and EOM are normal.   Cardiovascular: Normal rate, regular rhythm and normal heart sounds.  Exam reveals no gallop and no friction rub.    No murmur heard.  Pulmonary/Chest: Effort normal and breath sounds normal. No respiratory distress.   no wheezes.   Neurological:  alert and oriented to person, place, and time.   Skin: Skin is warm and dry. not diaphoretic.   Psychiatric:  normal mood and affect. behavior is normal. Judgment and thought content normal.      Result Review :                   Assessment and Plan    Diagnoses and all orders for this visit:    1. Hypothyroidism (acquired) (Primary)-check levels  -     TSH; Future    2. Primary hypertension-good control.  She will start monitoring blood pressure more  -     Comprehensive Metabolic Panel; Future  -     Lipid Panel; Future    3. Alpha thalassemia  -     CBC & Differential; Future    4. Arthritis, multiple joint involvement-probably " osteoarthritis but we will go ahead and do the inflammatory markers as well  -     TONNY by IFA, Reflex to Titer and Pattern; Future  -     C-reactive Protein; Future  -     Rheumatoid Factor; Future  -     Sedimentation Rate; Future    5. Immunization due  -     Tdap Vaccine => 6yo IM (BOOSTRIX)    She will let me know if the fast heart rate alerts go off more frequently on her watch.  She will also take note if it occurs when she has not had the evening clonidine    Follow Up   Return in about 6 months (around 11/3/2024) for Annual.  Patient was given instructions and counseling regarding her condition or for health maintenance advice. Please see specific information pulled into the AVS if appropriate.

## 2024-05-03 NOTE — LETTER
[facility]  Vaccine Consent Form    Patient Name:  Bhavani Coon  Patient :  1965     Vaccine(s) Ordered    Tdap Vaccine => 6yo IM (BOOSTRIX)        Screening Checklist  The following questions should be completed prior to vaccination. If you answer “yes” to any question, it does not necessarily mean you should not be vaccinated. It just means we may need to clarify or ask more questions. If a question is unclear, please ask your healthcare provider to explain it.    Yes No   Any fever or moderate to severe illness today (mild illness and/or antibiotic treatment are not contraindications)?     Do you have a history of a serious reaction to any previous vaccinations, such as anaphylaxis, encephalopathy within 7 days, Guillain-[place] syndrome within 6 weeks, seizure?     Have you received any live vaccine(s) (e.g MMR, MICHAEL) or any other vaccines in the last month (to ensure duplicate doses aren't given)?     Do you have an anaphylactic allergy to latex (DTaP, DTaP-IPV, Hep A, Hep B, MenB, RV, Td, Tdap), baker’s yeast (Hep B, HPV), polysorbates (RSV, nirsevimab, PCV 20, Rotavirrus, Tdap, Shingrix), or gelatin (MICHAEL, MMR)?     Do you have an anaphylactic allergy to neomycin (Rabies, MICHAEL, MMR, IPV, Hep A), polymyxin B (IPV), or streptomycin (IPV)?      Any cancer, leukemia, AIDS, or other immune system disorder? (MICHAEL, MMR, RV)     Do you have a parent, brother, or sister with an immune system problem (if immune competence of vaccine recipient clinically verified, can proceed)? (MMR, MICHAEL)     Any recent steroid treatments for >2 weeks, chemotherapy, or radiation treatment? (MICHAEL, MMR)     Have you received antibody-containing blood transfusions or IVIG in the past 11 months (recommended interval is dependent on product)? (MMR, MICHAEL)     Have you taken antiviral drugs (acyclovir, famciclovir, valacyclovir for MICHAEL) in the last 24 or 48 hours, respectively?      Are you pregnant or planning to become pregnant within  "1 month? (MICHAEL, MMR, HPV, IPV, MenB, Abrexvy; For Hep B- refer to Engerix-B; For RSV - Abrysvo is indicated for 32-36 weeks of pregnancy from September to January)     For infants, have you ever been told your child has had intussusception or a medical emergency involving obstruction of the intestine (Rotavirus)? If not for an infant, can skip this question.         *Ordering Physicians/APC should be consulted if \"yes\" is checked by the patient or guardian above.  I have received, read, and understand the Vaccine Information Statement (VIS) for each vaccine ordered.  I have considered my or my child's health status as well as the health status of my close contacts.  I have taken the opportunity to discuss my vaccine questions with my or my child's health care provider.   I have requested that the ordered vaccine(s) be given to me or my child.  I understand the benefits and risks of the vaccines.  I understand that I should remain in the clinic for 15 minutes after receiving the vaccine(s).  _________________________________________________________  Signature of Patient or Parent/Legal Guardian ____________________  Date     "

## 2024-05-06 LAB — ANA SER QL IF: NEGATIVE

## 2024-05-07 ENCOUNTER — TELEPHONE (OUTPATIENT)
Dept: INTERNAL MEDICINE | Facility: CLINIC | Age: 59
End: 2024-05-07
Payer: COMMERCIAL

## 2024-05-27 DIAGNOSIS — E03.9 HYPOTHYROIDISM (ACQUIRED): ICD-10-CM

## 2024-05-28 RX ORDER — LEVOTHYROXINE SODIUM 0.05 MG/1
50 TABLET ORAL DAILY
Qty: 90 TABLET | Refills: 1 | Status: SHIPPED | OUTPATIENT
Start: 2024-05-28

## 2024-05-28 NOTE — TELEPHONE ENCOUNTER
Rx Refill Note  Requested Prescriptions     Pending Prescriptions Disp Refills    levothyroxine (SYNTHROID, LEVOTHROID) 50 MCG tablet [Pharmacy Med Name: LEVOTHYROXINE 50 MCG TABLET] 90 tablet 1     Sig: TAKE 1 TABLET BY MOUTH DAILY      Last office visit with prescribing clinician: 5/3/2024     Next office visit with prescribing clinician: 11/1/2024       Rcoio Mata  05/28/24, 10:52 EDT

## 2024-06-03 ENCOUNTER — OFFICE VISIT (OUTPATIENT)
Dept: INTERNAL MEDICINE | Facility: CLINIC | Age: 59
End: 2024-06-03
Payer: COMMERCIAL

## 2024-06-03 VITALS
HEART RATE: 69 BPM | OXYGEN SATURATION: 98 % | BODY MASS INDEX: 23.47 KG/M2 | DIASTOLIC BLOOD PRESSURE: 82 MMHG | TEMPERATURE: 97.8 F | WEIGHT: 116.4 LBS | RESPIRATION RATE: 14 BRPM | HEIGHT: 59 IN | SYSTOLIC BLOOD PRESSURE: 124 MMHG

## 2024-06-03 DIAGNOSIS — R59.9 SWOLLEN LYMPH NODES: ICD-10-CM

## 2024-06-03 DIAGNOSIS — N76.0 ACUTE VAGINITIS: ICD-10-CM

## 2024-06-03 DIAGNOSIS — J02.9 SORE THROAT: ICD-10-CM

## 2024-06-03 DIAGNOSIS — J02.9 PHARYNGITIS, UNSPECIFIED ETIOLOGY: Primary | ICD-10-CM

## 2024-06-03 LAB
EXPIRATION DATE: NORMAL
EXPIRATION DATE: NORMAL
FLUAV AG UPPER RESP QL IA.RAPID: NOT DETECTED
FLUBV AG UPPER RESP QL IA.RAPID: NOT DETECTED
INTERNAL CONTROL: NORMAL
INTERNAL CONTROL: NORMAL
Lab: NORMAL
Lab: NORMAL
S PYO AG THROAT QL: NEGATIVE
SARS-COV-2 AG UPPER RESP QL IA.RAPID: NOT DETECTED

## 2024-06-03 PROCEDURE — 87880 STREP A ASSAY W/OPTIC: CPT | Performed by: PHYSICIAN ASSISTANT

## 2024-06-03 PROCEDURE — 87428 SARSCOV & INF VIR A&B AG IA: CPT | Performed by: PHYSICIAN ASSISTANT

## 2024-06-03 PROCEDURE — 99213 OFFICE O/P EST LOW 20 MIN: CPT | Performed by: PHYSICIAN ASSISTANT

## 2024-06-03 RX ORDER — QUETIAPINE 200 MG/1
200 TABLET, FILM COATED, EXTENDED RELEASE ORAL NIGHTLY
COMMUNITY

## 2024-06-03 RX ORDER — FLUCONAZOLE 150 MG/1
150 TABLET ORAL WEEKLY
Qty: 2 TABLET | Refills: 0 | Status: SHIPPED | OUTPATIENT
Start: 2024-06-03

## 2024-06-03 RX ORDER — AMOXICILLIN AND CLAVULANATE POTASSIUM 875; 125 MG/1; MG/1
1 TABLET, FILM COATED ORAL 2 TIMES DAILY
Qty: 20 TABLET | Refills: 0 | Status: SHIPPED | OUTPATIENT
Start: 2024-06-03

## 2024-06-03 RX ORDER — ONDANSETRON 4 MG/1
4 TABLET, ORALLY DISINTEGRATING ORAL EVERY 8 HOURS PRN
Qty: 20 TABLET | Refills: 0 | Status: SHIPPED | OUTPATIENT
Start: 2024-06-03

## 2024-06-03 NOTE — PROGRESS NOTES
Chief Complaint  Swollen Glands (Lymph nodes are swollen, started last week, sore throat last week, nausea, diarrhea, weakness, no cough, unsure of fever )    Subjective          History of Present Illness  Bhavani Coon presents to [facility] for   History of Present Illness  Sore Throat:  Started getting swollen lymph nodes in neck and very sore throat on Wednesday. Had chills, not sure if she had a fever.  Has not had a cough or congestion in her sinuses. She has had a mild h/a and her voice started sounding hoarse today.  Has been taking Amox 500 BID for the last 3 days, she had it in her cabinet from her previous illness.   Had one episode of vomiting over the weekend, has had intermittent diarrhea.  Has a vaginal yeast infection now and is req yeast med.   Had a negative covid test at home.  Sore Throat   This is a new problem. The current episode started in the past 7 days.   Pain is worse on both side(s). The maximum temperature recorded prior to her arrival was 100 - 101 F. The fever has been present for 1 to 2 days. The pain is at a severity of 7/10. Associated symptoms include abdominal pain, congestion, diarrhea, ear pain, headaches, a hoarse voice, neck pain, swollen glands, trouble swallowing and vomiting. Pertinent negatives include no coughing, drooling or shortness of breath.   Swollen Glands  Associated symptoms include abdominal pain, congestion, headaches, neck pain, a sore throat, swollen glands and vomiting. Pertinent negatives include no coughing.       The following portions of the patient's history were reviewed and updated as appropriate: allergies, current medications, past family history, past medical history, past social history, past surgical history and problem list.  Allergies   Allergen Reactions    Vitamin B12 Rash     Rash and nausea and vomiting       Current Outpatient Medications:     acyclovir (ZOVIRAX) 400 MG tablet, TAKE 1 TABLET BY MOUTH THREE  "TIMES A DAY FOR 5 DAYS AS NEEDED FOR OUTBREAKS AND THEN TAKE 1 TABLET BY MOUTH TWO TIMES A DAY BETWEEN BREAKOUTS, Disp: 90 tablet, Rfl: 11    buPROPion XL (WELLBUTRIN XL) 300 MG 24 hr tablet, Take 1 tablet by mouth Daily., Disp: , Rfl:     cloNIDine (CATAPRES) 0.3 MG tablet, 1 tablet 2 (Two) Times a Day., Disp: , Rfl:     levothyroxine (SYNTHROID, LEVOTHROID) 50 MCG tablet, TAKE 1 TABLET BY MOUTH DAILY, Disp: 90 tablet, Rfl: 1    lisinopril (PRINIVIL,ZESTRIL) 10 MG tablet, TAKE 1 TABLET BY MOUTH DAILY, Disp: 90 tablet, Rfl: 0    ondansetron ODT (ZOFRAN-ODT) 4 MG disintegrating tablet, Take 1 tablet by mouth Every 8 (Eight) Hours As Needed for Nausea or Vomiting., Disp: 20 tablet, Rfl: 0    QUEtiapine XR (SEROquel XR) 200 MG 24 hr tablet, Take 1 tablet by mouth Every Night., Disp: , Rfl:     amoxicillin-clavulanate (AUGMENTIN) 875-125 MG per tablet, Take 1 tablet by mouth 2 (Two) Times a Day., Disp: 20 tablet, Rfl: 0    fluconazole (Diflucan) 150 MG tablet, Take 1 tablet by mouth 1 (One) Time Per Week., Disp: 2 tablet, Rfl: 0  New Medications Ordered This Visit   Medications    amoxicillin-clavulanate (AUGMENTIN) 875-125 MG per tablet     Sig: Take 1 tablet by mouth 2 (Two) Times a Day.     Dispense:  20 tablet     Refill:  0    fluconazole (Diflucan) 150 MG tablet     Sig: Take 1 tablet by mouth 1 (One) Time Per Week.     Dispense:  2 tablet     Refill:  0    ondansetron ODT (ZOFRAN-ODT) 4 MG disintegrating tablet     Sig: Take 1 tablet by mouth Every 8 (Eight) Hours As Needed for Nausea or Vomiting.     Dispense:  20 tablet     Refill:  0     Social History     Tobacco Use   Smoking Status Never   Smokeless Tobacco Never        Objective   Vital Signs:   Vitals:    06/03/24 1616   BP: 124/82   Pulse: 69   Resp: 14   Temp: 97.8 °F (36.6 °C)   TempSrc: Infrared   SpO2: 98%   Weight: 52.8 kg (116 lb 6.4 oz)   Height: 149.2 cm (58.74\")      Body mass index is 23.72 kg/m².  Physical Exam  Vitals reviewed. "   Constitutional:       General: She is not in acute distress.     Appearance: Normal appearance.   HENT:      Head: Normocephalic and atraumatic.      Right Ear: Tympanic membrane, ear canal and external ear normal.      Left Ear: Tympanic membrane, ear canal and external ear normal.      Nose: Nose normal.      Mouth/Throat:      Mouth: Mucous membranes are moist.      Pharynx: Posterior oropharyngeal erythema present. No oropharyngeal exudate.   Eyes:      General: No scleral icterus.     Extraocular Movements: Extraocular movements intact.      Conjunctiva/sclera: Conjunctivae normal.   Cardiovascular:      Rate and Rhythm: Normal rate and regular rhythm.      Heart sounds: Normal heart sounds. No murmur heard.  Pulmonary:      Effort: Pulmonary effort is normal. No respiratory distress.      Breath sounds: Normal breath sounds. No stridor. No wheezing or rhonchi.   Musculoskeletal:      Cervical back: Normal range of motion and neck supple.   Skin:     General: Skin is warm and dry.      Coloration: Skin is not jaundiced.   Neurological:      General: No focal deficit present.      Mental Status: She is alert and oriented to person, place, and time.      Gait: Gait normal.   Psychiatric:         Mood and Affect: Mood normal.         Behavior: Behavior normal.        No LMP recorded (lmp unknown). Patient has had a hysterectomy.  BMI is within normal parameters. No other follow-up for BMI required.      Result Review :              Results for orders placed or performed in visit on 06/03/24   POCT rapid strep A    Specimen: Swab   Result Value Ref Range    Rapid Strep A Screen Negative Negative, VALID, INVALID, Not Performed    Internal Control Passed Passed    Lot Number [id number]     Expiration Date 07/09/2025    POCT SARS-CoV-2 Antigen GARY + Flu    Specimen: Swab   Result Value Ref Range    SARS Antigen Not Detected Not Detected, Presumptive Negative    Influenza A Antigen GARY Not Detected Not Detected     Influenza B Antigen GARY Not Detected Not Detected    Internal Control Passed Passed    Lot Number [id number]     Expiration Date 02/05/2025           Assessment and Plan    Diagnoses and all orders for this visit:    1. Pharyngitis, unspecified etiology (Primary)  Assessment & Plan:  Treat with abx to cover for infection, rss not reliable d/t being on a few d of amox already.   Supportive care, stay hydrated, rest.  Follow up if symptoms worsen or persist. Monitor for new symptoms. Go to the ER for respiratory distress, dehydration, or severe symptoms.      Orders:  -     amoxicillin-clavulanate (AUGMENTIN) 875-125 MG per tablet; Take 1 tablet by mouth 2 (Two) Times a Day.  Dispense: 20 tablet; Refill: 0  -     ondansetron ODT (ZOFRAN-ODT) 4 MG disintegrating tablet; Take 1 tablet by mouth Every 8 (Eight) Hours As Needed for Nausea or Vomiting.  Dispense: 20 tablet; Refill: 0    2. Sore throat  -     POCT rapid strep A    3. Swollen lymph nodes  -     POCT SARS-CoV-2 Antigen GARY + Flu    4. Acute vaginitis  -     fluconazole (Diflucan) 150 MG tablet; Take 1 tablet by mouth 1 (One) Time Per Week.  Dispense: 2 tablet; Refill: 0        Follow Up   No follow-ups on file.    Follow up if symptoms worsen or persist or has new or concerning symptoms, go to ER for severe symptoms.   Reviewed common medication effects and side effects and advised to report side effects immediately.  Encouraged medication compliance and the importance of keeping scheduled follow up appointments with me and any other providers.  If a referral was made please contact our office if you have not heard about an appointment in the next 2 weeks.   If labs or images are ordered we will contact you with the results within the next week.  If you have not heard from us after a week please call our office to inquire about the results.   Patient was given instructions and counseling regarding her condition or for health maintenance advice. Please see  specific information pulled into the AVS if appropriate.     Erin Rodas PA-C    * Please note that portions of this note were completed with a voice recognition program.   Answers submitted by the patient for this visit:  Primary Reason for Visit (Submitted on 6/3/2024)  What is the primary reason for your visit?: Sore Throat  Sore Throat Questionnaire (Submitted on 6/3/2024)  Chief Complaint: Sore throat  drainage: No

## 2024-06-16 PROBLEM — J02.9 PHARYNGITIS: Status: ACTIVE | Noted: 2024-06-16

## 2024-06-17 NOTE — ASSESSMENT & PLAN NOTE
Treat with abx to cover for infection, rss not reliable d/t being on a few d of amox already.   Supportive care, stay hydrated, rest.  Follow up if symptoms worsen or persist. Monitor for new symptoms. Go to the ER for respiratory distress, dehydration, or severe symptoms.

## 2024-07-16 ENCOUNTER — TRANSCRIBE ORDERS (OUTPATIENT)
Dept: INTERNAL MEDICINE | Facility: CLINIC | Age: 59
End: 2024-07-16
Payer: COMMERCIAL

## 2024-07-16 DIAGNOSIS — Z12.31 VISIT FOR SCREENING MAMMOGRAM: Primary | ICD-10-CM

## 2024-08-28 DIAGNOSIS — I10 HYPERTENSION, UNSPECIFIED TYPE: ICD-10-CM

## 2024-08-28 RX ORDER — LISINOPRIL 10 MG/1
10 TABLET ORAL DAILY
Qty: 90 TABLET | Refills: 0 | Status: SHIPPED | OUTPATIENT
Start: 2024-08-28

## 2024-08-28 NOTE — TELEPHONE ENCOUNTER
Rx Refill Note  Requested Prescriptions     Pending Prescriptions Disp Refills    lisinopril (PRINIVIL,ZESTRIL) 10 MG tablet [Pharmacy Med Name: LISINOPRIL 10 MG TABLET] 90 tablet 0     Sig: TAKE 1 TABLET BY MOUTH DAILY      Last office visit with prescribing clinician: 5/3/2024   Last telemedicine visit with prescribing clinician: Visit date not found   Next office visit with prescribing clinician: 11/1/2024   Last filled: 5/2/24      Vonnie Benoit MA  08/28/24, 17:40 EDT

## 2024-09-27 LAB
NCCN CRITERIA FLAG: ABNORMAL
TYRER CUZICK SCORE: 15.9

## 2024-10-02 DIAGNOSIS — Z13.79 GENETIC TESTING: Primary | ICD-10-CM

## 2024-10-03 ENCOUNTER — LAB (OUTPATIENT)
Dept: LAB | Facility: HOSPITAL | Age: 59
End: 2024-10-03
Payer: COMMERCIAL

## 2024-10-03 ENCOUNTER — HOSPITAL ENCOUNTER (OUTPATIENT)
Dept: MAMMOGRAPHY | Facility: HOSPITAL | Age: 59
Discharge: HOME | End: 2024-10-03
Payer: COMMERCIAL

## 2024-10-03 DIAGNOSIS — Z12.31 VISIT FOR SCREENING MAMMOGRAM: ICD-10-CM

## 2024-10-03 DIAGNOSIS — Z13.79 GENETIC TESTING: ICD-10-CM

## 2024-10-03 PROCEDURE — 77063 BREAST TOMOSYNTHESIS BI: CPT

## 2024-10-03 PROCEDURE — 77067 SCR MAMMO BI INCL CAD: CPT

## 2024-10-03 PROCEDURE — 36415 COLL VENOUS BLD VENIPUNCTURE: CPT

## 2024-10-17 LAB
AMBRY INTERPRETATION REPORT: NORMAL
GENE DIS ANL INTERP-IMP: NORMAL

## 2024-11-01 ENCOUNTER — OFFICE VISIT (OUTPATIENT)
Dept: INTERNAL MEDICINE | Facility: CLINIC | Age: 59
End: 2024-11-01
Payer: COMMERCIAL

## 2024-11-01 VITALS
WEIGHT: 121.4 LBS | TEMPERATURE: 97.3 F | HEIGHT: 59 IN | BODY MASS INDEX: 24.48 KG/M2 | SYSTOLIC BLOOD PRESSURE: 128 MMHG | OXYGEN SATURATION: 99 % | DIASTOLIC BLOOD PRESSURE: 84 MMHG | HEART RATE: 75 BPM | RESPIRATION RATE: 16 BRPM

## 2024-11-01 DIAGNOSIS — E55.9 VITAMIN D DEFICIENCY: ICD-10-CM

## 2024-11-01 DIAGNOSIS — Z00.00 ROUTINE GENERAL MEDICAL EXAMINATION AT A HEALTH CARE FACILITY: Primary | ICD-10-CM

## 2024-11-01 DIAGNOSIS — N76.0 ACUTE VAGINITIS: ICD-10-CM

## 2024-11-01 DIAGNOSIS — E53.8 B12 DEFICIENCY: ICD-10-CM

## 2024-11-01 DIAGNOSIS — Z23 IMMUNIZATION DUE: ICD-10-CM

## 2024-11-01 LAB
BILIRUB BLD-MCNC: NEGATIVE MG/DL
CLARITY, POC: CLEAR
COLOR UR: YELLOW
EXPIRATION DATE: NORMAL
GLUCOSE UR STRIP-MCNC: NEGATIVE MG/DL
KETONES UR QL: NEGATIVE
LEUKOCYTE EST, POC: NEGATIVE
Lab: NORMAL
NITRITE UR-MCNC: NEGATIVE MG/ML
PH UR: 6 [PH] (ref 5–8)
PROT UR STRIP-MCNC: NEGATIVE MG/DL
RBC # UR STRIP: NEGATIVE /UL
SP GR UR: 1.01 (ref 1–1.03)
UROBILINOGEN UR QL: NORMAL

## 2024-11-01 RX ORDER — FLUCONAZOLE 150 MG/1
150 TABLET ORAL ONCE
Qty: 1 TABLET | Refills: 0 | Status: SHIPPED | OUTPATIENT
Start: 2024-11-01 | End: 2024-11-01

## 2024-11-01 NOTE — PROGRESS NOTES
Here for physical    Diet:  tries to eat healthy   does a daily MVI  Caffeine-2 cups of coffee per day. ETOH:none/rare  Exercise: walking her dogs daily and hasn't been doing the weights as much w/ her shoulder pain     she had LEEP in '95, and had a supracervical hysterectomy and USO in '09. She has had hot flashes on and off for several years    Fever blister-she is on acyclovir bid and will take more when she gets a fever blister or a genital herpes outbreak.       Hypothyroid - she is on synthroid 50.  Weight just a few pounds up from 5 months ago. BM have been regular     HTN - she is on  Lisinopril.  Has not checked recently at home but has felt okay and blood pressures have been okay at previous visits     Opioid addiction - she is off suboxone     Anxiety - she sees behavioral health  and is on Seroquel, wellbutirn and clonidine and doing well.     B shoulder pain - she sees Dr. Jackman at [facility] and gets cortisone injections periodically.  Did have CT scan of the left shoulder earlier this year which showed arthritis.  Needs to have surgery but is trying to put it off till after her daughter's wedding next year     she noticed several days of vag itching and discharge over the last few days    L breast felt a little sore around nipple last month.  Had her mammogram which was okay.  Has not felt a lump    Current Outpatient Medications on File Prior to Visit   Medication Sig Dispense Refill   • acyclovir (ZOVIRAX) 400 MG tablet TAKE 1 TABLET BY MOUTH THREE TIMES A DAY FOR 5 DAYS AS NEEDED FOR OUTBREAKS AND THEN TAKE 1 TABLET BY MOUTH TWO TIMES A DAY BETWEEN BREAKOUTS 90 tablet 11   • buPROPion XL (WELLBUTRIN XL) 300 MG 24 hr tablet Take 1 tablet by mouth Daily.     • cloNIDine (CATAPRES) 0.3 MG tablet 1 tablet 2 (Two) Times a Day.     • levothyroxine (SYNTHROID, LEVOTHROID) 50 MCG tablet TAKE 1 TABLET BY MOUTH DAILY 90 tablet 1   • lisinopril (PRINIVIL,ZESTRIL) 10 MG tablet TAKE 1 TABLET BY MOUTH  DAILY 90 tablet 0   • ondansetron ODT (ZOFRAN-ODT) 4 MG disintegrating tablet Take 1 tablet by mouth Every 8 (Eight) Hours As Needed for Nausea or Vomiting. 20 tablet 0   • QUEtiapine XR (SEROquel XR) 200 MG 24 hr tablet Take 1 tablet by mouth Every Night.     • amoxicillin-clavulanate (AUGMENTIN) 875-125 MG per tablet Take 1 tablet by mouth 2 (Two) Times a Day. 20 tablet 0   • fluconazole (Diflucan) 150 MG tablet Take 1 tablet by mouth 1 (One) Time Per Week. 2 tablet 0     No current facility-administered medications on file prior to visit.       The following portions of the patient's history were reviewed and updated as appropriate: allergies, current medications, past family history, past medical history, past social history, past surgical history and problem list.    Review of Systems   Constitutional:  Positive for unexpected weight gain (wt up a few pounds). Negative for activity change, appetite change, fever and unexpected weight loss.   HENT: Negative.     Eyes: Negative.    Respiratory:  Negative for shortness of breath and wheezing.    Cardiovascular:  Negative for chest pain, palpitations and leg swelling.   Gastrointestinal: Negative.    Endocrine: Negative.  Positive for heat intolerance (still occasional hot flashes though not as often).   Genitourinary:  Positive for vaginal discharge (and itching). Negative for difficulty urinating and dysuria.   Musculoskeletal:  Positive for arthralgias (B shoulders).   Skin: Negative.    Allergic/Immunologic: Negative for immunocompromised state.   Neurological:  Negative for seizures, speech difficulty, memory problem and confusion.   Hematological:  Does not bruise/bleed easily.   Psychiatric/Behavioral:  Negative for agitation.         Stable on medications through behavioral health       Objective   /84 (BP Location: Right arm, Patient Position: Sitting, Cuff Size: Adult)   Pulse 75   Temp 97.3 °F (36.3 °C) (Temporal)   Resp 16   Ht 149.2 cm  "(58.74\")   Wt 55.1 kg (121 lb 6.4 oz)   LMP  (LMP Unknown)   SpO2 99%   BMI 24.74 kg/m²   Physical Exam   Physical Exam  Vitals and nursing note reviewed.   Constitutional:       General: She is not in acute distress.     Appearance: Normal appearance. She is well-developed. She is not diaphoretic.   HENT:      Head: Normocephalic and atraumatic.      Right Ear: External ear normal.      Left Ear: External ear normal.      Nose: Nose normal.      Mouth/Throat:      Pharynx: No oropharyngeal exudate.   Eyes:      General: No scleral icterus.        Right eye: No discharge.         Left eye: No discharge.      Conjunctiva/sclera: Conjunctivae normal.      Pupils: Pupils are equal, round, and reactive to light.   Neck:      Thyroid: No thyromegaly.   Cardiovascular:      Rate and Rhythm: Normal rate and regular rhythm.      Heart sounds: Normal heart sounds. No murmur heard.     No friction rub. No gallop.   Pulmonary:      Effort: Pulmonary effort is normal. No respiratory distress.      Breath sounds: Normal breath sounds. No wheezing or rales.   Chest:   Breasts:     Right: No mass, nipple discharge, skin change or tenderness.      Left: No mass (none appreciated, no tenderness today), nipple discharge, skin change or tenderness.   Abdominal:      General: Bowel sounds are normal. There is no distension.      Palpations: Abdomen is soft. There is no mass.      Tenderness: There is no abdominal tenderness. There is no guarding or rebound.   Musculoskeletal:         General: No deformity.      Cervical back: Normal range of motion and neck supple.      Comments: decreased active range of motion of the left shoulder   Lymphadenopathy:      Cervical: No cervical adenopathy.   Skin:     General: Skin is warm and dry.      Coloration: Skin is not pale.      Findings: No erythema or rash.   Neurological:      Mental Status: She is alert and oriented to person, place, and time.      Coordination: Coordination normal.    "   Deep Tendon Reflexes: Reflexes normal.   Psychiatric:         Behavior: Behavior normal.         Thought Content: Thought content normal.         Judgment: Judgment normal.       Assessment/Plan   Diagnoses and all orders for this visit:    1. Routine general medical examination at a health care facility (Primary)  Regular exercise, healthy diet. BSE qmonth. Sunscreen use encouraged. Check fasting labs.   Mary Alice due 10/25  Colon due 4/29 (Dr Vazquez)  DT due '34  DEXA - her ortho has ordered last year and pt states she was told it was OK  Shingles- shingrix discussed -she might return for this  Flu shot- today  covid -she declines  Pap due in '25 -  does still have cervix  -     CBC (No Diff); Future  -     TSH Rfx On Abnormal To Free T4; Future  -     Lipid Panel; Future  -     Comprehensive Metabolic Panel; Future  -     POCT urinalysis dipstick, automated    2. Immunization due  -     Fluzone >6mos ([id number])    3. Vitamin D deficiency  -     Vitamin D,25-Hydroxy; Future    4. B12 deficiency  -     Vitamin B12; Future    5.  Vaginal candidiasis  -     fluconazole (Diflucan) 150 MG tablet; Take 1 tablet by mouth 1 (One) Time for 1 dose.  Dispense: 1 tablet; Refill: 0      6.  Hypertension-pressure looks good today on lisinopril.  Recommended she check blood pressure once a week for monitoring

## 2024-11-04 ENCOUNTER — LAB (OUTPATIENT)
Dept: LAB | Facility: HOSPITAL | Age: 59
End: 2024-11-04
Payer: COMMERCIAL

## 2024-11-04 DIAGNOSIS — E53.8 B12 DEFICIENCY: ICD-10-CM

## 2024-11-04 DIAGNOSIS — Z00.00 ROUTINE GENERAL MEDICAL EXAMINATION AT A HEALTH CARE FACILITY: ICD-10-CM

## 2024-11-04 DIAGNOSIS — E55.9 VITAMIN D DEFICIENCY: ICD-10-CM

## 2024-11-04 LAB
25(OH)D3 SERPL-MCNC: 25.6 NG/ML (ref 30–100)
ALBUMIN SERPL-MCNC: 4.1 G/DL (ref 3.5–5.2)
ALBUMIN/GLOB SERPL: 2.2 G/DL
ALP SERPL-CCNC: 69 U/L (ref 39–117)
ALT SERPL W P-5'-P-CCNC: 11 U/L (ref 1–33)
ANION GAP SERPL CALCULATED.3IONS-SCNC: 11 MMOL/L (ref 5–15)
AST SERPL-CCNC: 14 U/L (ref 1–32)
BILIRUB SERPL-MCNC: 0.2 MG/DL (ref 0–1.2)
BUN SERPL-MCNC: 9 MG/DL (ref 6–20)
BUN/CREAT SERPL: 12 (ref 7–25)
CALCIUM SPEC-SCNC: 9.2 MG/DL (ref 8.6–10.5)
CHLORIDE SERPL-SCNC: 102 MMOL/L (ref 98–107)
CHOLEST SERPL-MCNC: 161 MG/DL (ref 0–200)
CO2 SERPL-SCNC: 26 MMOL/L (ref 22–29)
CREAT SERPL-MCNC: 0.75 MG/DL (ref 0.57–1)
DEPRECATED RDW RBC AUTO: 43.8 FL (ref 37–54)
EGFRCR SERPLBLD CKD-EPI 2021: 92.4 ML/MIN/1.73
ERYTHROCYTE [DISTWIDTH] IN BLOOD BY AUTOMATED COUNT: 16.6 % (ref 12.3–15.4)
GLOBULIN UR ELPH-MCNC: 1.9 GM/DL
GLUCOSE SERPL-MCNC: 83 MG/DL (ref 65–99)
HCT VFR BLD AUTO: 34.6 % (ref 34–46.6)
HDLC SERPL-MCNC: 55 MG/DL (ref 40–60)
HGB BLD-MCNC: 10.3 G/DL (ref 12–15.9)
LDLC SERPL CALC-MCNC: 90 MG/DL (ref 0–100)
LDLC/HDLC SERPL: 1.61 {RATIO}
MCH RBC QN AUTO: 22.2 PG (ref 26.6–33)
MCHC RBC AUTO-ENTMCNC: 29.8 G/DL (ref 31.5–35.7)
MCV RBC AUTO: 74.4 FL (ref 79–97)
PLATELET # BLD AUTO: 337 10*3/MM3 (ref 140–450)
PMV BLD AUTO: 9.7 FL (ref 6–12)
POTASSIUM SERPL-SCNC: 4.7 MMOL/L (ref 3.5–5.2)
PROT SERPL-MCNC: 6 G/DL (ref 6–8.5)
RBC # BLD AUTO: 4.65 10*6/MM3 (ref 3.77–5.28)
SODIUM SERPL-SCNC: 139 MMOL/L (ref 136–145)
TRIGL SERPL-MCNC: 86 MG/DL (ref 0–150)
TSH SERPL DL<=0.05 MIU/L-ACNC: 1.36 UIU/ML (ref 0.27–4.2)
VIT B12 BLD-MCNC: 361 PG/ML (ref 211–946)
VLDLC SERPL-MCNC: 16 MG/DL (ref 5–40)
WBC NRBC COR # BLD AUTO: 6.99 10*3/MM3 (ref 3.4–10.8)

## 2024-11-04 PROCEDURE — 82607 VITAMIN B-12: CPT

## 2024-11-04 PROCEDURE — 80050 GENERAL HEALTH PANEL: CPT

## 2024-11-04 PROCEDURE — 82306 VITAMIN D 25 HYDROXY: CPT

## 2024-11-04 PROCEDURE — 80061 LIPID PANEL: CPT

## 2024-11-07 RX ORDER — FLUCONAZOLE 150 MG/1
150 TABLET ORAL ONCE
Qty: 1 TABLET | Refills: 0 | Status: SHIPPED | OUTPATIENT
Start: 2024-11-07 | End: 2024-11-07

## 2024-11-21 DIAGNOSIS — E03.9 HYPOTHYROIDISM (ACQUIRED): ICD-10-CM

## 2024-11-21 RX ORDER — LEVOTHYROXINE SODIUM 50 UG/1
50 TABLET ORAL DAILY
Qty: 90 TABLET | Refills: 3 | Status: SHIPPED | OUTPATIENT
Start: 2024-11-21

## 2024-11-21 NOTE — TELEPHONE ENCOUNTER
Rx Refill Note  Requested Prescriptions     Pending Prescriptions Disp Refills    levothyroxine (SYNTHROID, LEVOTHROID) 50 MCG tablet [Pharmacy Med Name: LEVOTHYROXINE 50 MCG TABLET] 90 tablet 1     Sig: TAKE 1 TABLET BY MOUTH DAILY      Last office visit with prescribing clinician: 11/1/2024     Next office visit with prescribing clinician: 5/2/2025     Rocio Mata  11/21/24, 07:21 EST

## 2024-11-30 DIAGNOSIS — I10 HYPERTENSION, UNSPECIFIED TYPE: ICD-10-CM

## 2024-12-02 RX ORDER — LISINOPRIL 10 MG/1
10 TABLET ORAL DAILY
Qty: 90 TABLET | Refills: 1 | Status: SHIPPED | OUTPATIENT
Start: 2024-12-02

## 2024-12-02 NOTE — TELEPHONE ENCOUNTER
Rx Refill Note  Requested Prescriptions     Pending Prescriptions Disp Refills    lisinopril (PRINIVIL,ZESTRIL) 10 MG tablet [Pharmacy Med Name: LISINOPRIL 10 MG TABLET] 90 tablet 1     Sig: TAKE 1 TABLET BY MOUTH DAILY      Last office visit with prescribing clinician: 11/1/2024     Next office visit with prescribing clinician: 5/2/2025       Rocio Mata  12/02/24, 09:43 EST

## 2025-01-02 DIAGNOSIS — B00.9 HERPES SIMPLEX: ICD-10-CM

## 2025-01-03 RX ORDER — ACYCLOVIR 400 MG/1
TABLET ORAL
Qty: 90 TABLET | Refills: 11 | Status: SHIPPED | OUTPATIENT
Start: 2025-01-03

## 2025-01-03 NOTE — TELEPHONE ENCOUNTER
Rx Refill Note  Requested Prescriptions     Pending Prescriptions Disp Refills    acyclovir (ZOVIRAX) 400 MG tablet [Pharmacy Med Name: ACYCLOVIR 400 MG TABLET] 90 tablet 11     Sig: TAKE ONE TABLET BY MOUTH THREE TIMES A DAY FOR 5 DAYS AS NEEDED FOR OUTBREAKS THEN TAKE 1 TABLET BY MOUTH TWICE A DAY IN BETWEEN OUTBREAKS      Last office visit with prescribing clinician: 11/1/2024     Next office visit with prescribing clinician: 5/2/2025       Tracy Rivers MA  01/03/25, 13:40 EST

## 2025-05-02 ENCOUNTER — OFFICE VISIT (OUTPATIENT)
Dept: INTERNAL MEDICINE | Facility: CLINIC | Age: 60
End: 2025-05-02
Payer: COMMERCIAL

## 2025-05-02 ENCOUNTER — LAB (OUTPATIENT)
Dept: INTERNAL MEDICINE | Facility: CLINIC | Age: 60
End: 2025-05-02
Payer: COMMERCIAL

## 2025-05-02 VITALS
SYSTOLIC BLOOD PRESSURE: 112 MMHG | OXYGEN SATURATION: 98 % | BODY MASS INDEX: 23.55 KG/M2 | WEIGHT: 116.8 LBS | TEMPERATURE: 97.7 F | HEIGHT: 59 IN | HEART RATE: 64 BPM | DIASTOLIC BLOOD PRESSURE: 68 MMHG | RESPIRATION RATE: 16 BRPM

## 2025-05-02 DIAGNOSIS — E53.8 B12 DEFICIENCY: ICD-10-CM

## 2025-05-02 DIAGNOSIS — E03.9 HYPOTHYROIDISM (ACQUIRED): Chronic | ICD-10-CM

## 2025-05-02 DIAGNOSIS — E55.9 VITAMIN D DEFICIENCY: ICD-10-CM

## 2025-05-02 DIAGNOSIS — I10 PRIMARY HYPERTENSION: Chronic | ICD-10-CM

## 2025-05-02 DIAGNOSIS — M25.551 RIGHT HIP PAIN: ICD-10-CM

## 2025-05-02 DIAGNOSIS — I10 PRIMARY HYPERTENSION: Primary | Chronic | ICD-10-CM

## 2025-05-02 PROBLEM — J02.9 PHARYNGITIS: Status: RESOLVED | Noted: 2024-06-16 | Resolved: 2025-05-02

## 2025-05-02 LAB
ALBUMIN SERPL-MCNC: 4.2 G/DL (ref 3.5–5.2)
ALBUMIN/GLOB SERPL: 1.8 G/DL
ALP SERPL-CCNC: 60 U/L (ref 39–117)
ALT SERPL W P-5'-P-CCNC: 11 U/L (ref 1–33)
ANION GAP SERPL CALCULATED.3IONS-SCNC: 9.9 MMOL/L (ref 5–15)
AST SERPL-CCNC: 16 U/L (ref 1–32)
BILIRUB SERPL-MCNC: 0.2 MG/DL (ref 0–1.2)
BUN SERPL-MCNC: 11 MG/DL (ref 6–20)
BUN/CREAT SERPL: 14.9 (ref 7–25)
CALCIUM SPEC-SCNC: 9.2 MG/DL (ref 8.6–10.5)
CHLORIDE SERPL-SCNC: 101 MMOL/L (ref 98–107)
CO2 SERPL-SCNC: 25.1 MMOL/L (ref 22–29)
CREAT SERPL-MCNC: 0.74 MG/DL (ref 0.57–1)
EGFRCR SERPLBLD CKD-EPI 2021: 93.3 ML/MIN/1.73
GLOBULIN UR ELPH-MCNC: 2.3 GM/DL
GLUCOSE SERPL-MCNC: 82 MG/DL (ref 65–99)
POTASSIUM SERPL-SCNC: 4.3 MMOL/L (ref 3.5–5.2)
PROT SERPL-MCNC: 6.5 G/DL (ref 6–8.5)
SODIUM SERPL-SCNC: 136 MMOL/L (ref 136–145)
TSH SERPL DL<=0.05 MIU/L-ACNC: 1.23 UIU/ML (ref 0.27–4.2)

## 2025-05-02 PROCEDURE — 82607 VITAMIN B-12: CPT | Performed by: INTERNAL MEDICINE

## 2025-05-02 PROCEDURE — 99214 OFFICE O/P EST MOD 30 MIN: CPT | Performed by: INTERNAL MEDICINE

## 2025-05-02 PROCEDURE — 82306 VITAMIN D 25 HYDROXY: CPT | Performed by: INTERNAL MEDICINE

## 2025-05-02 PROCEDURE — 36415 COLL VENOUS BLD VENIPUNCTURE: CPT | Performed by: INTERNAL MEDICINE

## 2025-05-02 PROCEDURE — 80050 GENERAL HEALTH PANEL: CPT | Performed by: INTERNAL MEDICINE

## 2025-05-02 RX ORDER — LISINOPRIL 10 MG/1
10 TABLET ORAL DAILY
Qty: 90 TABLET | Refills: 1 | Status: SHIPPED | OUTPATIENT
Start: 2025-05-02

## 2025-05-02 NOTE — PROGRESS NOTES
Answers submitted by the patient for this visit:  Problem not listed (Submitted on 5/1/2025)  Chief Complaint: Other medical problem  Reason for appointment: Routine visit  Chief Complaint  Hypothyroidism, Hypertension, and herpes simplex    Subjective          Bhavani Coon presents to [facility]    Hypothyroid - she is on synthroid 50.  Weight down a few pounds from last visit.     HTN - she is on  Lisinopril.  Has not checked recently at home but has felt okay and blood pressures have been okay at previous visits  She tries to eat healthy and walks regularly     Anxiety - she sees behavioral health  and is on Seroquel, wellbutirn and clonidine and doing well. Has been off suboxone for ~2 yrs.  She feels like she is doing well on this regiment     B shoulder pain - she sees Dr. Jackman at [facility] and gets cortisone injections periodically.  Did have CT scan of the left shoulder last year which showed arthritis.  Needs to have surgery but shoulders aren't as bad recently, having more trouble with right hip-it has been giving her trouble for few years.  Will feel pain in the buttock, sometimes in the groin, sometimes down the right leg. Has not had a recent xray in hip, usually worse as day goes on  She takes tyl, 3 a day, which does help.  She finds the Tylenol helps more than the ibuprofen    Vitamin D deficiency-her vitamin D level was 25 last year - she has been taking the D regularly    B12 def - b12 was low normal, and she has been taking regularly      Current Outpatient Medications:     acyclovir (ZOVIRAX) 400 MG tablet, TAKE ONE TABLET BY MOUTH THREE TIMES A DAY FOR 5 DAYS AS NEEDED FOR OUTBREAKS THEN TAKE 1 TABLET BY MOUTH TWICE A DAY IN BETWEEN OUTBREAKS, Disp: 90 tablet, Rfl: 11    buPROPion XL (WELLBUTRIN XL) 300 MG 24 hr tablet, Take 1 tablet by mouth Daily., Disp: , Rfl:     cloNIDine (CATAPRES) 0.3 MG tablet, 1 tablet 2 (Two) Times a Day., Disp: , Rfl:      "levothyroxine (SYNTHROID, LEVOTHROID) 50 MCG tablet, TAKE 1 TABLET BY MOUTH DAILY, Disp: 90 tablet, Rfl: 3    lisinopril (PRINIVIL,ZESTRIL) 10 MG tablet, Take 1 tablet by mouth Daily., Disp: 90 tablet, Rfl: 1    ondansetron ODT (ZOFRAN-ODT) 4 MG disintegrating tablet, Take 1 tablet by mouth Every 8 (Eight) Hours As Needed for Nausea or Vomiting., Disp: 20 tablet, Rfl: 0    QUEtiapine XR (SEROquel XR) 200 MG 24 hr tablet, Take 1 tablet by mouth Every Night., Disp: , Rfl:    The following portions of the patient's history were reviewed and updated as appropriate: allergies, current medications, past family history, past medical history, past social history, past surgical history and problem list.     Objective   Vital Signs:   /68 (BP Location: Right arm, Patient Position: Sitting, Cuff Size: Adult)   Pulse 64   Temp 97.7 °F (36.5 °C) (Infrared)   Resp 16   Ht 149.2 cm (58.74\")   Wt 53 kg (116 lb 12.8 oz)   SpO2 98%   BMI 23.80 kg/m²       Physical exam  Constitutional: oriented to person, place, and time.  well-developed and well-nourished. No distress.   HENT:   Head: Normocephalic and atraumatic.   Eyes: Conjunctivae and EOM are normal.   Cardiovascular: Normal rate, regular rhythm and normal heart sounds.  Exam reveals no gallop and no friction rub.    No murmur heard.  Pulmonary/Chest: Effort normal and breath sounds normal. No respiratory distress.   no wheezes.   Neurological:  alert and oriented to person, place, and time.   Skin: Skin is warm and dry. not diaphoretic.   MS: Some tenderness over right lateral hip.  Negative straight leg raise on the right.  Slight discomfort in groin with right hip rotation  Psychiatric:  normal mood and affect. behavior is normal. Judgment and thought content normal.      Physical Exam     Result Review :                    Lab Results   Component Value Date    WBC 6.99 11/04/2024    HGB 10.3 (L) 11/04/2024    HCT 34.6 11/04/2024    MCV 74.4 (L) 11/04/2024    PLT " "337 11/04/2024     Lab Results   Component Value Date    GLUCOSE 83 11/04/2024    BUN 9 11/04/2024    CREATININE 0.75 11/04/2024     11/04/2024    K 4.7 11/04/2024     11/04/2024    CALCIUM 9.2 11/04/2024    PROTEINTOT 6.0 11/04/2024    ALBUMIN 4.1 11/04/2024    ALT 11 11/04/2024    AST 14 11/04/2024    ALKPHOS 69 11/04/2024    BILITOT 0.2 11/04/2024    GLOB 1.9 11/04/2024    AGRATIO 2.2 11/04/2024    BCR 12.0 11/04/2024    ANIONGAP 11.0 11/04/2024    EGFR 92.4 11/04/2024     Lab Results   Component Value Date    CHOL 161 11/04/2024    TRIG 86 11/04/2024    HDL 55 11/04/2024    LDL 90 11/04/2024     No results found for: \"HGBA1C\"  Lab Results   Component Value Date    TSH 1.360 11/04/2024     No results found for: \"FZGC110\"         Assessment and Plan      Diagnoses and all orders for this visit:    1. Primary hypertension (Primary)-good control.  We will continue lisinopril.  Check labs today  -     CBC (No Diff); Future  -     Comprehensive Metabolic Panel; Future  -     lisinopril (PRINIVIL,ZESTRIL) 10 MG tablet; Take 1 tablet by mouth Daily.  Dispense: 90 tablet; Refill: 1    2. Hypothyroidism (acquired)-check TSH  -     TSH; Future    3. Vitamin D deficiency-we will recheck vitamin D level  -     Vitamin D,25-Hydroxy; Future    4. B12 deficiency  -     Vitamin B12; Future    5. Right hip pain-we will go ahead and get an x-ray and see if there is some underlying arthritis.  We discussed having her see Ortho again but she wants to till after her daughter's wedding which is at the end of this month in [place]  -     XR Hip With or Without Pelvis 2 - 3 View Right; Future          Follow Up   Return in about 6 months (around 11/2/2025) for Annual physical.  Patient was given instructions and counseling regarding her condition or for health maintenance advice. Please see specific information pulled into the AVS if appropriate.   "

## 2025-05-03 LAB
25(OH)D3 SERPL-MCNC: 74.3 NG/ML (ref 30–100)
DEPRECATED RDW RBC AUTO: 41 FL (ref 37–54)
ERYTHROCYTE [DISTWIDTH] IN BLOOD BY AUTOMATED COUNT: 16.2 % (ref 12.3–15.4)
HCT VFR BLD AUTO: 32.7 % (ref 34–46.6)
HGB BLD-MCNC: 10.1 G/DL (ref 12–15.9)
MCH RBC QN AUTO: 22 PG (ref 26.6–33)
MCHC RBC AUTO-ENTMCNC: 30.9 G/DL (ref 31.5–35.7)
MCV RBC AUTO: 71.2 FL (ref 79–97)
PLATELET # BLD AUTO: 312 10*3/MM3 (ref 140–450)
PMV BLD AUTO: 10 FL (ref 6–12)
RBC # BLD AUTO: 4.59 10*6/MM3 (ref 3.77–5.28)
VIT B12 BLD-MCNC: >2000 PG/ML (ref 211–946)
WBC NRBC COR # BLD AUTO: 7.14 10*3/MM3 (ref 3.4–10.8)

## 2025-05-23 ENCOUNTER — PATIENT MESSAGE (OUTPATIENT)
Dept: INTERNAL MEDICINE | Facility: CLINIC | Age: 60
End: 2025-05-23
Payer: COMMERCIAL

## 2025-06-24 ENCOUNTER — PATIENT MESSAGE (OUTPATIENT)
Dept: INTERNAL MEDICINE | Facility: CLINIC | Age: 60
End: 2025-06-24
Payer: COMMERCIAL

## 2025-07-24 ENCOUNTER — OFFICE VISIT (OUTPATIENT)
Dept: INTERNAL MEDICINE | Facility: CLINIC | Age: 60
End: 2025-07-24
Payer: COMMERCIAL

## 2025-07-24 VITALS
DIASTOLIC BLOOD PRESSURE: 86 MMHG | RESPIRATION RATE: 16 BRPM | WEIGHT: 109.2 LBS | OXYGEN SATURATION: 100 % | HEART RATE: 84 BPM | SYSTOLIC BLOOD PRESSURE: 136 MMHG | TEMPERATURE: 98.2 F | BODY MASS INDEX: 22.01 KG/M2 | HEIGHT: 59 IN

## 2025-07-24 DIAGNOSIS — R07.9 CHEST PAIN, UNSPECIFIED TYPE: Chronic | ICD-10-CM

## 2025-07-24 DIAGNOSIS — Z13.6 SCREENING FOR CARDIOVASCULAR CONDITION: ICD-10-CM

## 2025-07-24 DIAGNOSIS — I44.7 LBBB (LEFT BUNDLE BRANCH BLOCK): ICD-10-CM

## 2025-07-24 DIAGNOSIS — I10 PRIMARY HYPERTENSION: Primary | Chronic | ICD-10-CM

## 2025-07-24 PROCEDURE — 93000 ELECTROCARDIOGRAM COMPLETE: CPT | Performed by: INTERNAL MEDICINE

## 2025-07-24 PROCEDURE — 99214 OFFICE O/P EST MOD 30 MIN: CPT | Performed by: INTERNAL MEDICINE

## 2025-07-24 NOTE — PROGRESS NOTES
Answers submitted by the patient for this visit:  High Blood Pressure Questionnaire (Submitted on 7/21/2025)  Chief Complaint: Hypertension  Chronicity: new  Onset: more than 1 year ago  Progression since onset: stable  Condition status: resistant  anxiety: No  blurred vision: No  chest pain: No  headaches: No  malaise/fatigue: No  orthopnea: No  palpitations: No  peripheral edema: No  shortness of breath: No  Agents associated with hypertension: no associated agents  Compliance problems: no compliance problems  Additional Information: I am just trying to get all this under control.  I will discuss all this with dr lawler.  Chief Complaint  Hypertension (Patient brought her at home BP cuff and in office reading with it was 139/95 HR 83)    Subjective          Bhavani Coon presents to [facility]    Hypertension-patient has been on lisinopril qhs  for many years.  She takes clonidine through behavioral health, which she had previously been taking 0.3 twice daily  She had sent me a message 1 month ago that she had a very low blood pressure reading in the morning when she woke up and felt weak.   She held clonidine and then she had a very high reading.  I wanted her to check with behavioral health to see about doing the lower dose of the clonidine.she has talked to her psychiatrist and dose was decreased to once a night rather than twice daily  She is walking daily and trying to eat healthy.  Has lost weight intentionally    In 2022 she had several episodes of chest pain.  A stress test was ordered but she was unable to do Lexiscan because of claustrophobia. She was then scheduled in [place] for an open chair stress test but had an anxiety attack.  She has a known LBBB.  In 2012, she was noted to have a IBBB and underwent a LHC by Dr Ceballos at [facility] in '12. LHC was negative. There was another EKG from '15 that showed a LBBB.  Today, she states she does have CP sometimes over the last few  "months, not daily. Not generally exertional. Usually on the left side and may last 30 minutes. Not food related    Palpitations - she will some episodes of fast heart rate 110 range on her apple watch, often just when she is resting.last time was several months ago  Usually just has one cup of coffee a day, no soda  No sudafed/decongestants        Current Outpatient Medications:     acyclovir (ZOVIRAX) 400 MG tablet, TAKE ONE TABLET BY MOUTH THREE TIMES A DAY FOR 5 DAYS AS NEEDED FOR OUTBREAKS THEN TAKE 1 TABLET BY MOUTH TWICE A DAY IN BETWEEN OUTBREAKS, Disp: 90 tablet, Rfl: 11    buPROPion XL (WELLBUTRIN XL) 300 MG 24 hr tablet, Take 1 tablet by mouth Daily., Disp: , Rfl:     cloNIDine (CATAPRES) 0.3 MG tablet, 1 tablet 2 (Two) Times a Day. (Patient taking differently: Take 1 tablet by mouth Daily.), Disp: , Rfl:     levothyroxine (SYNTHROID, LEVOTHROID) 50 MCG tablet, TAKE 1 TABLET BY MOUTH DAILY, Disp: 90 tablet, Rfl: 3    lisinopril (PRINIVIL,ZESTRIL) 10 MG tablet, Take 1 tablet by mouth Daily., Disp: 90 tablet, Rfl: 1    ondansetron ODT (ZOFRAN-ODT) 4 MG disintegrating tablet, Take 1 tablet by mouth Every 8 (Eight) Hours As Needed for Nausea or Vomiting., Disp: 20 tablet, Rfl: 0    QUEtiapine XR (SEROquel XR) 200 MG 24 hr tablet, Take 1 tablet by mouth Every Night., Disp: , Rfl:    The following portions of the patient's history were reviewed and updated as appropriate: allergies, current medications, past family history, past medical history, past social history, past surgical history and problem list.     Objective   Vital Signs:   /86 (BP Location: Right arm, Patient Position: Sitting, Cuff Size: Adult)   Pulse 84   Temp 98.2 °F (36.8 °C) (Infrared)   Resp 16   Ht 149.2 cm (58.74\")   Wt 49.5 kg (109 lb 3.2 oz)   SpO2 100%   BMI 22.25 kg/m²       Physical exam  Constitutional: oriented to person, place, and time.  well-developed and well-nourished. No distress.   HENT:   Head: Normocephalic and " "atraumatic.   Eyes: Conjunctivae and EOM are normal.   Cardiovascular: Normal rate, regular rhythm and normal heart sounds.  Exam reveals no gallop and no friction rub.    No murmur heard.  Pulmonary/Chest: Effort normal and breath sounds normal. No respiratory distress.   no wheezes.  No chest wall tenderness  Abdomen: Mild epigastric tenderness  Neurological:  alert and oriented to person, place, and time.   Skin: Skin is warm and dry. not diaphoretic.   Psychiatric:  normal mood and affect. behavior is normal. Judgment and thought content normal.      Physical Exam     Result Review :              ECG 12 Lead    Date/Time: 7/24/2025 4:36 PM  Performed by: Lainey Mcmahon MD    Authorized by: Lainey Mcmahon MD  Comparison: compared with previous ECG from 8/19/2022  Similar to previous ECG  Rhythm: sinus rhythm  Rate: normal  Conduction: left bundle branch block    Clinical impression: abnormal EKG             Lab Results   Component Value Date    WBC 7.14 05/02/2025    HGB 10.1 (L) 05/02/2025    HCT 32.7 (L) 05/02/2025    MCV 71.2 (L) 05/02/2025     05/02/2025     Lab Results   Component Value Date    GLUCOSE 82 05/02/2025    BUN 11 05/02/2025    CREATININE 0.74 05/02/2025     05/02/2025    K 4.3 05/02/2025     05/02/2025    CALCIUM 9.2 05/02/2025    PROTEINTOT 6.5 05/02/2025    ALBUMIN 4.2 05/02/2025    ALT 11 05/02/2025    AST 16 05/02/2025    ALKPHOS 60 05/02/2025    BILITOT 0.2 05/02/2025    GLOB 2.3 05/02/2025    AGRATIO 1.8 05/02/2025    BCR 14.9 05/02/2025    ANIONGAP 9.9 05/02/2025    EGFR 93.3 05/02/2025     Lab Results   Component Value Date    CHOL 161 11/04/2024    TRIG 86 11/04/2024    HDL 55 11/04/2024    LDL 90 11/04/2024     No results found for: \"HGBA1C\"  Lab Results   Component Value Date    TSH 1.230 05/02/2025     No results found for: \"KUTZ888\"         Assessment and Plan      Diagnoses and all orders for this visit:    1. Primary hypertension (Primary)-blood pressure " just slightly high today, and it does look like patient's cuff is inaccurate so she will get a new cuff.  We will leave medications the same for now and she has follow-up next month.  Would recommend doing the lisinopril in the morning and the clonidine at night  -     ECG 12 Lead    2. Chest pain, unspecified type-patient has had intermittently for several years.  Not typical for angina and had a normal heart cath in 2012.  We will get a calcium score  for cardiac screening and consider further testing if the score is elevated    3. Screening for cardiovascular condition  -     CT Cardiac Calcium Score Without Dye; Future    4. LBBB (left bundle branch block)-patient has had for many years          Follow Up   Return in about 4 weeks (around 8/21/2025).  Patient was given instructions and counseling regarding her condition or for health maintenance advice. Please see specific information pulled into the AVS if appropriate.

## 2025-07-29 DIAGNOSIS — M25.551 RIGHT HIP PAIN: Primary | ICD-10-CM

## 2025-08-02 ENCOUNTER — HOSPITAL ENCOUNTER (OUTPATIENT)
Facility: HOSPITAL | Age: 60
Discharge: HOME | End: 2025-08-02

## 2025-08-02 DIAGNOSIS — Z13.6 SCREENING FOR CARDIOVASCULAR CONDITION: ICD-10-CM

## 2025-08-02 DIAGNOSIS — M25.551 RIGHT HIP PAIN: ICD-10-CM

## 2025-08-02 PROCEDURE — 73502 X-RAY EXAM HIP UNI 2-3 VIEWS: CPT

## 2025-08-02 PROCEDURE — 75571 CT HRT W/O DYE W/CA TEST: CPT

## 2025-08-05 DIAGNOSIS — R07.89 ATYPICAL CHEST PAIN: Primary | ICD-10-CM

## 2025-08-05 DIAGNOSIS — R93.1 ELEVATED CORONARY ARTERY CALCIUM SCORE: ICD-10-CM

## 2025-08-05 RX ORDER — ATORVASTATIN CALCIUM 10 MG/1
10 TABLET, FILM COATED ORAL DAILY
Qty: 30 TABLET | Refills: 2 | Status: SHIPPED | OUTPATIENT
Start: 2025-08-05

## 2025-08-08 DIAGNOSIS — M16.11 PRIMARY OSTEOARTHRITIS OF RIGHT HIP: Primary | ICD-10-CM
